# Patient Record
Sex: FEMALE | Race: BLACK OR AFRICAN AMERICAN | NOT HISPANIC OR LATINO
[De-identification: names, ages, dates, MRNs, and addresses within clinical notes are randomized per-mention and may not be internally consistent; named-entity substitution may affect disease eponyms.]

---

## 2017-03-05 ENCOUNTER — RESULT REVIEW (OUTPATIENT)
Age: 77
End: 2017-03-05

## 2018-07-11 ENCOUNTER — APPOINTMENT (OUTPATIENT)
Dept: ORTHOPEDIC SURGERY | Facility: CLINIC | Age: 78
End: 2018-07-11
Payer: MEDICARE

## 2018-07-11 VITALS — WEIGHT: 203 LBS | HEIGHT: 66 IN | BODY MASS INDEX: 32.62 KG/M2

## 2018-07-11 DIAGNOSIS — M17.12 UNILATERAL PRIMARY OSTEOARTHRITIS, LEFT KNEE: ICD-10-CM

## 2018-07-11 DIAGNOSIS — M17.11 UNILATERAL PRIMARY OSTEOARTHRITIS, RIGHT KNEE: ICD-10-CM

## 2018-07-11 PROCEDURE — 99202 OFFICE O/P NEW SF 15 MIN: CPT

## 2018-07-11 PROCEDURE — 73564 X-RAY EXAM KNEE 4 OR MORE: CPT | Mod: LT

## 2018-09-12 ENCOUNTER — OUTPATIENT (OUTPATIENT)
Dept: OUTPATIENT SERVICES | Facility: HOSPITAL | Age: 78
LOS: 1 days | End: 2018-09-12
Payer: COMMERCIAL

## 2018-09-12 VITALS
TEMPERATURE: 98 F | HEART RATE: 75 BPM | SYSTOLIC BLOOD PRESSURE: 136 MMHG | RESPIRATION RATE: 16 BRPM | HEIGHT: 65 IN | OXYGEN SATURATION: 96 % | WEIGHT: 201.94 LBS | DIASTOLIC BLOOD PRESSURE: 90 MMHG

## 2018-09-12 DIAGNOSIS — Z98.89 OTHER SPECIFIED POSTPROCEDURAL STATES: Chronic | ICD-10-CM

## 2018-09-12 DIAGNOSIS — M17.11 UNILATERAL PRIMARY OSTEOARTHRITIS, RIGHT KNEE: ICD-10-CM

## 2018-09-12 DIAGNOSIS — Z01.818 ENCOUNTER FOR OTHER PREPROCEDURAL EXAMINATION: ICD-10-CM

## 2018-09-12 DIAGNOSIS — Z96.642 PRESENCE OF LEFT ARTIFICIAL HIP JOINT: Chronic | ICD-10-CM

## 2018-09-12 DIAGNOSIS — Z96.641 PRESENCE OF RIGHT ARTIFICIAL HIP JOINT: Chronic | ICD-10-CM

## 2018-09-12 LAB
ALBUMIN SERPL ELPH-MCNC: 4.3 G/DL — SIGNIFICANT CHANGE UP (ref 3.3–5)
ALP SERPL-CCNC: 77 U/L — SIGNIFICANT CHANGE UP (ref 30–120)
ALT FLD-CCNC: 30 U/L DA — SIGNIFICANT CHANGE UP (ref 10–60)
ANION GAP SERPL CALC-SCNC: 6 MMOL/L — SIGNIFICANT CHANGE UP (ref 5–17)
APTT BLD: 34.3 SEC — SIGNIFICANT CHANGE UP (ref 27.5–37.4)
AST SERPL-CCNC: 25 U/L — SIGNIFICANT CHANGE UP (ref 10–40)
BILIRUB SERPL-MCNC: 0.5 MG/DL — SIGNIFICANT CHANGE UP (ref 0.2–1.2)
BLD GP AB SCN SERPL QL: SIGNIFICANT CHANGE UP
BUN SERPL-MCNC: 13 MG/DL — SIGNIFICANT CHANGE UP (ref 7–23)
CALCIUM SERPL-MCNC: 11.4 MG/DL — HIGH (ref 8.4–10.5)
CHLORIDE SERPL-SCNC: 106 MMOL/L — SIGNIFICANT CHANGE UP (ref 96–108)
CO2 SERPL-SCNC: 30 MMOL/L — SIGNIFICANT CHANGE UP (ref 22–31)
CREAT SERPL-MCNC: 0.94 MG/DL — SIGNIFICANT CHANGE UP (ref 0.5–1.3)
GLUCOSE SERPL-MCNC: 100 MG/DL — HIGH (ref 70–99)
HCT VFR BLD CALC: 47.3 % — HIGH (ref 34.5–45)
HGB BLD-MCNC: 15 G/DL — SIGNIFICANT CHANGE UP (ref 11.5–15.5)
INR BLD: 1.01 RATIO — SIGNIFICANT CHANGE UP (ref 0.88–1.16)
MCHC RBC-ENTMCNC: 29.1 PG — SIGNIFICANT CHANGE UP (ref 27–34)
MCHC RBC-ENTMCNC: 31.7 GM/DL — LOW (ref 32–36)
MCV RBC AUTO: 91.8 FL — SIGNIFICANT CHANGE UP (ref 80–100)
NRBC # BLD: 0 /100 WBCS — SIGNIFICANT CHANGE UP (ref 0–0)
PLATELET # BLD AUTO: 192 K/UL — SIGNIFICANT CHANGE UP (ref 150–400)
POTASSIUM SERPL-MCNC: 4 MMOL/L — SIGNIFICANT CHANGE UP (ref 3.5–5.3)
POTASSIUM SERPL-SCNC: 4 MMOL/L — SIGNIFICANT CHANGE UP (ref 3.5–5.3)
PROT SERPL-MCNC: 8.2 G/DL — SIGNIFICANT CHANGE UP (ref 6–8.3)
PROTHROM AB SERPL-ACNC: 11 SEC — SIGNIFICANT CHANGE UP (ref 9.8–12.7)
RBC # BLD: 5.15 M/UL — SIGNIFICANT CHANGE UP (ref 3.8–5.2)
RBC # FLD: 12.8 % — SIGNIFICANT CHANGE UP (ref 10.3–14.5)
SODIUM SERPL-SCNC: 142 MMOL/L — SIGNIFICANT CHANGE UP (ref 135–145)
WBC # BLD: 5.78 K/UL — SIGNIFICANT CHANGE UP (ref 3.8–10.5)
WBC # FLD AUTO: 5.78 K/UL — SIGNIFICANT CHANGE UP (ref 3.8–10.5)

## 2018-09-12 PROCEDURE — 86850 RBC ANTIBODY SCREEN: CPT

## 2018-09-12 PROCEDURE — 80053 COMPREHEN METABOLIC PANEL: CPT

## 2018-09-12 PROCEDURE — 87640 STAPH A DNA AMP PROBE: CPT

## 2018-09-12 PROCEDURE — 86900 BLOOD TYPING SEROLOGIC ABO: CPT

## 2018-09-12 PROCEDURE — 86901 BLOOD TYPING SEROLOGIC RH(D): CPT

## 2018-09-12 PROCEDURE — 85027 COMPLETE CBC AUTOMATED: CPT

## 2018-09-12 PROCEDURE — 85730 THROMBOPLASTIN TIME PARTIAL: CPT

## 2018-09-12 PROCEDURE — 93010 ELECTROCARDIOGRAM REPORT: CPT

## 2018-09-12 PROCEDURE — G0463: CPT

## 2018-09-12 PROCEDURE — 85610 PROTHROMBIN TIME: CPT

## 2018-09-12 PROCEDURE — 36415 COLL VENOUS BLD VENIPUNCTURE: CPT

## 2018-09-12 PROCEDURE — 93005 ELECTROCARDIOGRAM TRACING: CPT

## 2018-09-12 NOTE — H&P PST ADULT - FAMILY HISTORY
Sibling  Still living? No  Family history of stroke, Age at diagnosis: Age Unknown  Family history of lung cancer, Age at diagnosis: Age Unknown

## 2018-09-12 NOTE — H&P PST ADULT - MUSCULOSKELETAL
details… detailed exam no calf tenderness/no joint erythema/no joint swelling/normal strength/decreased ROM due to pain/no joint warmth

## 2018-09-12 NOTE — H&P PST ADULT - HISTORY OF PRESENT ILLNESS
78 yo female presents to PST for scheduled RIGHT total knee replacement on 10/2/18 with Rosales Hopkins MD.  Patient with longstanding RIGHT knee pain with swelling for which she has tried PT without relief.  Pain worst with stair climbing, locking and rates 10/10.

## 2018-09-12 NOTE — H&P PST ADULT - NEGATIVE ENMT SYMPTOMS
no nose bleeds/no sinus symptoms/no ear pain/no nasal discharge/no gum bleeding/no dry mouth/no throat pain/no dysphagia/no tinnitus/no hearing difficulty/no nasal congestion/no nasal obstruction/no recurrent cold sores/no vertigo/no post-nasal discharge/no abnormal taste sensation

## 2018-09-12 NOTE — H&P PST ADULT - PROBLEM SELECTOR PLAN 1
"Right total knee replacement" on 10/2/18  Diagnostics ordered  Pending medical and cardiac clearance  Questions answered  Instructions reviewed and signed  Contact info given   Best wishes offered

## 2018-09-13 LAB
MRSA PCR RESULT.: SIGNIFICANT CHANGE UP
S AUREUS DNA NOSE QL NAA+PROBE: SIGNIFICANT CHANGE UP

## 2018-09-20 PROBLEM — E66.9 OBESITY, UNSPECIFIED: Chronic | Status: ACTIVE | Noted: 2018-09-12

## 2018-09-20 PROBLEM — M17.11 UNILATERAL PRIMARY OSTEOARTHRITIS, RIGHT KNEE: Chronic | Status: ACTIVE | Noted: 2018-09-12

## 2018-09-21 ENCOUNTER — OUTPATIENT (OUTPATIENT)
Dept: OUTPATIENT SERVICES | Facility: HOSPITAL | Age: 78
LOS: 1 days | End: 2018-09-21
Payer: COMMERCIAL

## 2018-09-21 DIAGNOSIS — Z01.818 ENCOUNTER FOR OTHER PREPROCEDURAL EXAMINATION: ICD-10-CM

## 2018-09-21 DIAGNOSIS — R94.31 ABNORMAL ELECTROCARDIOGRAM [ECG] [EKG]: ICD-10-CM

## 2018-09-21 DIAGNOSIS — Z96.641 PRESENCE OF RIGHT ARTIFICIAL HIP JOINT: Chronic | ICD-10-CM

## 2018-09-21 DIAGNOSIS — Z98.89 OTHER SPECIFIED POSTPROCEDURAL STATES: Chronic | ICD-10-CM

## 2018-09-21 DIAGNOSIS — Z96.642 PRESENCE OF LEFT ARTIFICIAL HIP JOINT: Chronic | ICD-10-CM

## 2018-09-21 PROCEDURE — 93018 CV STRESS TEST I&R ONLY: CPT

## 2018-09-21 PROCEDURE — 93017 CV STRESS TEST TRACING ONLY: CPT

## 2018-09-21 PROCEDURE — 93016 CV STRESS TEST SUPVJ ONLY: CPT

## 2018-09-21 PROCEDURE — 78452 HT MUSCLE IMAGE SPECT MULT: CPT

## 2018-09-21 PROCEDURE — A9502: CPT

## 2018-09-21 PROCEDURE — 78452 HT MUSCLE IMAGE SPECT MULT: CPT | Mod: 26

## 2018-10-01 ENCOUNTER — TRANSCRIPTION ENCOUNTER (OUTPATIENT)
Age: 78
End: 2018-10-01

## 2018-10-01 RX ORDER — SODIUM CHLORIDE 9 MG/ML
1000 INJECTION, SOLUTION INTRAVENOUS
Qty: 0 | Refills: 0 | Status: DISCONTINUED | OUTPATIENT
Start: 2018-10-02 | End: 2018-10-03

## 2018-10-01 RX ORDER — ONDANSETRON 8 MG/1
4 TABLET, FILM COATED ORAL EVERY 6 HOURS
Qty: 0 | Refills: 0 | Status: DISCONTINUED | OUTPATIENT
Start: 2018-10-02 | End: 2018-10-04

## 2018-10-01 RX ORDER — DOCUSATE SODIUM 100 MG
100 CAPSULE ORAL THREE TIMES A DAY
Qty: 0 | Refills: 0 | Status: DISCONTINUED | OUTPATIENT
Start: 2018-10-02 | End: 2018-10-04

## 2018-10-01 RX ORDER — PANTOPRAZOLE SODIUM 20 MG/1
40 TABLET, DELAYED RELEASE ORAL DAILY
Qty: 0 | Refills: 0 | Status: DISCONTINUED | OUTPATIENT
Start: 2018-10-03 | End: 2018-10-04

## 2018-10-01 RX ORDER — MAGNESIUM HYDROXIDE 400 MG/1
30 TABLET, CHEWABLE ORAL DAILY
Qty: 0 | Refills: 0 | Status: DISCONTINUED | OUTPATIENT
Start: 2018-10-02 | End: 2018-10-04

## 2018-10-01 RX ORDER — SENNA PLUS 8.6 MG/1
2 TABLET ORAL AT BEDTIME
Qty: 0 | Refills: 0 | Status: DISCONTINUED | OUTPATIENT
Start: 2018-10-02 | End: 2018-10-04

## 2018-10-01 RX ORDER — POLYETHYLENE GLYCOL 3350 17 G/17G
17 POWDER, FOR SOLUTION ORAL DAILY
Qty: 0 | Refills: 0 | Status: DISCONTINUED | OUTPATIENT
Start: 2018-10-02 | End: 2018-10-04

## 2018-10-01 NOTE — PATIENT PROFILE ADULT. - PMH
BMI 33.0-33.9,adult    Diverticulitis    Essential hypertension    Hyperlipidemia    Obesity (BMI 30.0-34.9)    Osteoarthritis of right knee

## 2018-10-01 NOTE — PATIENT PROFILE ADULT. - PSH
History of hip replacement, total, right  2016  S/P breast biopsy, left  2017  S/P hip replacement, left  2015 History of hip replacement, total, right  2016 bilateral  S/P breast biopsy, left  2017  S/P hip replacement, left  2015 bilateral

## 2018-10-02 ENCOUNTER — APPOINTMENT (OUTPATIENT)
Dept: ORTHOPEDIC SURGERY | Facility: HOSPITAL | Age: 78
End: 2018-10-02

## 2018-10-02 ENCOUNTER — INPATIENT (INPATIENT)
Facility: HOSPITAL | Age: 78
LOS: 1 days | Discharge: HOME | DRG: 470 | End: 2018-10-04
Attending: ORTHOPAEDIC SURGERY | Admitting: ORTHOPAEDIC SURGERY
Payer: COMMERCIAL

## 2018-10-02 ENCOUNTER — RESULT REVIEW (OUTPATIENT)
Age: 78
End: 2018-10-02

## 2018-10-02 VITALS
DIASTOLIC BLOOD PRESSURE: 102 MMHG | RESPIRATION RATE: 21 BRPM | SYSTOLIC BLOOD PRESSURE: 141 MMHG | HEIGHT: 65 IN | HEART RATE: 75 BPM | TEMPERATURE: 98 F | WEIGHT: 205.25 LBS | OXYGEN SATURATION: 98 %

## 2018-10-02 DIAGNOSIS — Z96.642 PRESENCE OF LEFT ARTIFICIAL HIP JOINT: Chronic | ICD-10-CM

## 2018-10-02 DIAGNOSIS — M17.11 UNILATERAL PRIMARY OSTEOARTHRITIS, RIGHT KNEE: ICD-10-CM

## 2018-10-02 DIAGNOSIS — Z96.641 PRESENCE OF RIGHT ARTIFICIAL HIP JOINT: Chronic | ICD-10-CM

## 2018-10-02 DIAGNOSIS — Z01.818 ENCOUNTER FOR OTHER PREPROCEDURAL EXAMINATION: ICD-10-CM

## 2018-10-02 DIAGNOSIS — Z98.89 OTHER SPECIFIED POSTPROCEDURAL STATES: Chronic | ICD-10-CM

## 2018-10-02 LAB
ANION GAP SERPL CALC-SCNC: 7 MMOL/L — SIGNIFICANT CHANGE UP (ref 5–17)
BUN SERPL-MCNC: 12 MG/DL — SIGNIFICANT CHANGE UP (ref 7–23)
CALCIUM SERPL-MCNC: 10 MG/DL — SIGNIFICANT CHANGE UP (ref 8.4–10.5)
CHLORIDE SERPL-SCNC: 106 MMOL/L — SIGNIFICANT CHANGE UP (ref 96–108)
CO2 SERPL-SCNC: 27 MMOL/L — SIGNIFICANT CHANGE UP (ref 22–31)
CREAT SERPL-MCNC: 0.92 MG/DL — SIGNIFICANT CHANGE UP (ref 0.5–1.3)
GLUCOSE SERPL-MCNC: 157 MG/DL — HIGH (ref 70–99)
HCT VFR BLD CALC: 43.3 % — SIGNIFICANT CHANGE UP (ref 34.5–45)
HGB BLD-MCNC: 13.7 G/DL — SIGNIFICANT CHANGE UP (ref 11.5–15.5)
POTASSIUM SERPL-MCNC: 4.2 MMOL/L — SIGNIFICANT CHANGE UP (ref 3.5–5.3)
POTASSIUM SERPL-SCNC: 4.2 MMOL/L — SIGNIFICANT CHANGE UP (ref 3.5–5.3)
SODIUM SERPL-SCNC: 140 MMOL/L — SIGNIFICANT CHANGE UP (ref 135–145)

## 2018-10-02 PROCEDURE — 88311 DECALCIFY TISSUE: CPT | Mod: 26

## 2018-10-02 PROCEDURE — 27447 TOTAL KNEE ARTHROPLASTY: CPT | Mod: RT

## 2018-10-02 PROCEDURE — 73562 X-RAY EXAM OF KNEE 3: CPT | Mod: 26,RT

## 2018-10-02 PROCEDURE — 88305 TISSUE EXAM BY PATHOLOGIST: CPT | Mod: 26

## 2018-10-02 PROCEDURE — 99222 1ST HOSP IP/OBS MODERATE 55: CPT

## 2018-10-02 PROCEDURE — 27447 TOTAL KNEE ARTHROPLASTY: CPT | Mod: AS,RT

## 2018-10-02 PROCEDURE — 73560 X-RAY EXAM OF KNEE 1 OR 2: CPT | Mod: 26,RT

## 2018-10-02 RX ORDER — ACETAMINOPHEN 500 MG
1000 TABLET ORAL EVERY 8 HOURS
Qty: 0 | Refills: 0 | Status: DISCONTINUED | OUTPATIENT
Start: 2018-10-03 | End: 2018-10-04

## 2018-10-02 RX ORDER — ATORVASTATIN CALCIUM 80 MG/1
10 TABLET, FILM COATED ORAL AT BEDTIME
Qty: 0 | Refills: 0 | Status: DISCONTINUED | OUTPATIENT
Start: 2018-10-02 | End: 2018-10-04

## 2018-10-02 RX ORDER — OXYCODONE HYDROCHLORIDE 5 MG/1
10 TABLET ORAL
Qty: 0 | Refills: 0 | Status: DISCONTINUED | OUTPATIENT
Start: 2018-10-02 | End: 2018-10-04

## 2018-10-02 RX ORDER — METOPROLOL TARTRATE 50 MG
25 TABLET ORAL DAILY
Qty: 0 | Refills: 0 | Status: DISCONTINUED | OUTPATIENT
Start: 2018-10-02 | End: 2018-10-04

## 2018-10-02 RX ORDER — HYDROMORPHONE HYDROCHLORIDE 2 MG/ML
0.5 INJECTION INTRAMUSCULAR; INTRAVENOUS; SUBCUTANEOUS
Qty: 0 | Refills: 0 | Status: DISCONTINUED | OUTPATIENT
Start: 2018-10-02 | End: 2018-10-02

## 2018-10-02 RX ORDER — ACETAMINOPHEN 500 MG
1000 TABLET ORAL ONCE
Qty: 0 | Refills: 0 | Status: COMPLETED | OUTPATIENT
Start: 2018-10-02 | End: 2018-10-02

## 2018-10-02 RX ORDER — SODIUM CHLORIDE 9 MG/ML
1000 INJECTION, SOLUTION INTRAVENOUS
Qty: 0 | Refills: 0 | Status: DISCONTINUED | OUTPATIENT
Start: 2018-10-02 | End: 2018-10-02

## 2018-10-02 RX ORDER — ONDANSETRON 8 MG/1
4 TABLET, FILM COATED ORAL ONCE
Qty: 0 | Refills: 0 | Status: DISCONTINUED | OUTPATIENT
Start: 2018-10-02 | End: 2018-10-02

## 2018-10-02 RX ORDER — ACETAMINOPHEN 500 MG
1000 TABLET ORAL EVERY 6 HOURS
Qty: 0 | Refills: 0 | Status: COMPLETED | OUTPATIENT
Start: 2018-10-02 | End: 2018-10-03

## 2018-10-02 RX ORDER — ASPIRIN/CALCIUM CARB/MAGNESIUM 324 MG
81 TABLET ORAL
Qty: 0 | Refills: 0 | Status: DISCONTINUED | OUTPATIENT
Start: 2018-10-03 | End: 2018-10-04

## 2018-10-02 RX ORDER — TRANEXAMIC ACID 100 MG/ML
1000 INJECTION, SOLUTION INTRAVENOUS ONCE
Qty: 0 | Refills: 0 | Status: COMPLETED | OUTPATIENT
Start: 2018-10-02 | End: 2018-10-02

## 2018-10-02 RX ORDER — CEFAZOLIN SODIUM 1 G
2000 VIAL (EA) INJECTION EVERY 8 HOURS
Qty: 0 | Refills: 0 | Status: COMPLETED | OUTPATIENT
Start: 2018-10-02 | End: 2018-10-03

## 2018-10-02 RX ORDER — HYDROMORPHONE HYDROCHLORIDE 2 MG/ML
0.5 INJECTION INTRAMUSCULAR; INTRAVENOUS; SUBCUTANEOUS
Qty: 0 | Refills: 0 | Status: DISCONTINUED | OUTPATIENT
Start: 2018-10-02 | End: 2018-10-04

## 2018-10-02 RX ORDER — AMLODIPINE BESYLATE 2.5 MG/1
10 TABLET ORAL DAILY
Qty: 0 | Refills: 0 | Status: DISCONTINUED | OUTPATIENT
Start: 2018-10-02 | End: 2018-10-04

## 2018-10-02 RX ORDER — CEFAZOLIN SODIUM 1 G
2000 VIAL (EA) INJECTION ONCE
Qty: 0 | Refills: 0 | Status: COMPLETED | OUTPATIENT
Start: 2018-10-02 | End: 2018-10-02

## 2018-10-02 RX ORDER — APREPITANT 80 MG/1
40 CAPSULE ORAL ONCE
Qty: 0 | Refills: 0 | Status: COMPLETED | OUTPATIENT
Start: 2018-10-02 | End: 2018-10-02

## 2018-10-02 RX ORDER — OXYCODONE HYDROCHLORIDE 5 MG/1
5 TABLET ORAL
Qty: 0 | Refills: 0 | Status: DISCONTINUED | OUTPATIENT
Start: 2018-10-02 | End: 2018-10-04

## 2018-10-02 RX ORDER — CHLORHEXIDINE GLUCONATE 213 G/1000ML
1 SOLUTION TOPICAL ONCE
Qty: 0 | Refills: 0 | Status: COMPLETED | OUTPATIENT
Start: 2018-10-02 | End: 2018-10-02

## 2018-10-02 RX ADMIN — Medication 1000 MILLIGRAM(S): at 18:08

## 2018-10-02 RX ADMIN — CHLORHEXIDINE GLUCONATE 1 APPLICATION(S): 213 SOLUTION TOPICAL at 10:47

## 2018-10-02 RX ADMIN — Medication 100 MILLIGRAM(S): at 20:05

## 2018-10-02 RX ADMIN — Medication 400 MILLIGRAM(S): at 23:31

## 2018-10-02 RX ADMIN — HYDROMORPHONE HYDROCHLORIDE 0.5 MILLIGRAM(S): 2 INJECTION INTRAMUSCULAR; INTRAVENOUS; SUBCUTANEOUS at 16:22

## 2018-10-02 RX ADMIN — SODIUM CHLORIDE 125 MILLILITER(S): 9 INJECTION, SOLUTION INTRAVENOUS at 20:05

## 2018-10-02 RX ADMIN — HYDROMORPHONE HYDROCHLORIDE 0.5 MILLIGRAM(S): 2 INJECTION INTRAMUSCULAR; INTRAVENOUS; SUBCUTANEOUS at 16:08

## 2018-10-02 RX ADMIN — APREPITANT 40 MILLIGRAM(S): 80 CAPSULE ORAL at 10:34

## 2018-10-02 RX ADMIN — OXYCODONE HYDROCHLORIDE 5 MILLIGRAM(S): 5 TABLET ORAL at 22:05

## 2018-10-02 RX ADMIN — Medication 400 MILLIGRAM(S): at 18:07

## 2018-10-02 RX ADMIN — ATORVASTATIN CALCIUM 10 MILLIGRAM(S): 80 TABLET, FILM COATED ORAL at 21:31

## 2018-10-02 RX ADMIN — SODIUM CHLORIDE 75 MILLILITER(S): 9 INJECTION, SOLUTION INTRAVENOUS at 15:40

## 2018-10-02 RX ADMIN — OXYCODONE HYDROCHLORIDE 5 MILLIGRAM(S): 5 TABLET ORAL at 21:31

## 2018-10-02 RX ADMIN — Medication 1000 MILLIGRAM(S): at 23:53

## 2018-10-02 NOTE — BRIEF OPERATIVE NOTE - PROCEDURE
<<-----Click on this checkbox to enter Procedure Knee arthroplasty  10/02/2018  right  Active  AROMANSK

## 2018-10-02 NOTE — CONSULT NOTE ADULT - SUBJECTIVE AND OBJECTIVE BOX
CC.  S/p Right Total knee replacement  HPI Patient is 78 yo female presenting s/p right Total knee replacement.  Pain well controlled.  Offers no other complaints    Pain prior to surgery has been longstanding with swelling for which she has tried PT without relief, severe to moderate, non-radiating, relieved by rest, and exacerbated with movement.  Pain worst with stair climbing, locking and rates 10/10.    Constitutional: No fever, fatigue or weight loss.  Skin: No rash.  Eyes: No recent vision problems or eye pain.  ENT: No congestion, ear pain, or sore throat.  Endocrine: No thyroid problems.  Cardiovascular: No chest pain or palpation.  Respiratory: No cough, shortness of breath, congestion, or wheezing.  Gastrointestinal: No abdominal pain, nausea, vomiting, or diarrhea.  Genitourinary: No dysuria.  Musculoskeletal: No joint swelling.  Neurologic: No headache.      Allergies/Medications:   Allergies:        Allergies:  	sulfa drugs: Drug Category, Other, unknown  	raw vegetables and raw fruit: Food, Nausea, raw vegetables and raw fruit    Home Medications:   * Patient Currently Takes Medications as of 12-Sep-2018 12:10 documented in Structured Notes  · 	atorvastatin 10 mg oral tablet: Last Dose Taken:  , 1 tab(s) orally once a day (at bedtime)  · 	amLODIPine 10 mg oral tablet: Last Dose Taken:  , 1 tab(s) orally once a day    .    PMH/PSH/FH/SH:    Past Medical History:  BMI 33.0-33.9,adult    Diverticulitis    Essential hypertension    Hyperlipidemia    Obesity (BMI 30.0-34.9)    Osteoarthritis of right knee.     Past Surgical History:  History of hip replacement, total, right  2016  S/P breast biopsy, left  2017  S/P hip replacement, left  .     Family History:  Sibling  Still living? No  Family history of stroke, Age at diagnosis: Age Unknown  Family history of lung cancer, Age at diagnosis: Age Unknown.     Social History:  · Marital Status	  · Occupation	retired  · Lives With	children  · Notes	5 children; 2 siblings   denies any ETOH/Tob/Illicit drug usage    Vital Signs Last 24 Hrs  T(C): 36.6 (02 Oct 2018 15:10), Max: 36.7 (02 Oct 2018 10:08)  T(F): 97.8 (02 Oct 2018 15:10), Max: 98 (02 Oct 2018 10:08)  HR: 70 (02 Oct 2018 15:55) (62 - 75)  BP: 123/76 (02 Oct 2018 15:55) (109/54 - 141/102)  BP(mean): --  RR: 20 (02 Oct 2018 15:55) (20 - 24)  SpO2: 98% (02 Oct 2018 15:55) (96% - 100%)      PHYSICAL EXAM-  GENERAL: NAD, well-groomed, well-developed  HEAD:  Atraumatic, Normocephalic  EYES: EOMI, PERRLA, conjunctiva and sclera clear  NECK: Supple, No JVD, Normal thyroid  NERVOUS SYSTEM:  Alert & Oriented X3, Motor Strength 5/5 B/L upper and lower extremities; DTRs 2+ intact and symmetric  CHEST/LUNG: Clear to percussion bilaterally; No rales, rhonchi, wheezing, or rubs  HEART: Regular rate and rhythm; No murmurs, rubs, or gallops  ABDOMEN: Soft, Nontender, Nondistended; Bowel sounds present  EXTREMITIES:  2+ Peripheral Pulses, No clubbing, cyanosis, or edema  SKIN: No rashes or lesions

## 2018-10-02 NOTE — PHYSICAL THERAPY INITIAL EVALUATION ADULT - ADDITIONAL COMMENTS
Pt lives with 2 sons in a house with 5 steps to enter with rail, 14 steps inside to bedroom with rail.  Pt has straight cane

## 2018-10-02 NOTE — PHYSICAL THERAPY INITIAL EVALUATION ADULT - GAIT DEVIATIONS NOTED, PT EVAL
decreased weight-shifting ability/decreased velocity of limb motion/decreased step length/decreased stride length/decreased sushant

## 2018-10-02 NOTE — CONSULT NOTE ADULT - ASSESSMENT
Patient is 78 yo female with hx of Diverticulitis, Essential hypertension, Hyperlipidemia, Obesity, and Osteoarthritis of right knee presenting with     1.  S/P right Total knee replacement.  Continue with pain management, DVT proph, and wound care as per Ortho.  PT/OT  2. HTN.  Continue with Norvasc and Monitor BP  3. HLD.  Continue with statin  Plan of care was discussed with patient  in great details, All questions were answered to their satisfaction  Seems to understand, and in agreement

## 2018-10-02 NOTE — PHYSICAL THERAPY INITIAL EVALUATION ADULT - GAIT TRAINING, PT EVAL
Goals 2-4 days, Pt will ambulate 150 ft w/ rolling walker independently.    Pt will negotiate 10 steps with rail and straight cane independently

## 2018-10-03 ENCOUNTER — TRANSCRIPTION ENCOUNTER (OUTPATIENT)
Age: 78
End: 2018-10-03

## 2018-10-03 LAB
ANION GAP SERPL CALC-SCNC: 6 MMOL/L — SIGNIFICANT CHANGE UP (ref 5–17)
BUN SERPL-MCNC: 10 MG/DL — SIGNIFICANT CHANGE UP (ref 7–23)
CALCIUM SERPL-MCNC: 10 MG/DL — SIGNIFICANT CHANGE UP (ref 8.4–10.5)
CHLORIDE SERPL-SCNC: 104 MMOL/L — SIGNIFICANT CHANGE UP (ref 96–108)
CO2 SERPL-SCNC: 26 MMOL/L — SIGNIFICANT CHANGE UP (ref 22–31)
CREAT SERPL-MCNC: 0.78 MG/DL — SIGNIFICANT CHANGE UP (ref 0.5–1.3)
GLUCOSE SERPL-MCNC: 147 MG/DL — HIGH (ref 70–99)
HCT VFR BLD CALC: 37.3 % — SIGNIFICANT CHANGE UP (ref 34.5–45)
HGB BLD-MCNC: 12.1 G/DL — SIGNIFICANT CHANGE UP (ref 11.5–15.5)
MCHC RBC-ENTMCNC: 29.7 PG — SIGNIFICANT CHANGE UP (ref 27–34)
MCHC RBC-ENTMCNC: 32.4 GM/DL — SIGNIFICANT CHANGE UP (ref 32–36)
MCV RBC AUTO: 91.4 FL — SIGNIFICANT CHANGE UP (ref 80–100)
NRBC # BLD: 0 /100 WBCS — SIGNIFICANT CHANGE UP (ref 0–0)
PLATELET # BLD AUTO: 180 K/UL — SIGNIFICANT CHANGE UP (ref 150–400)
POTASSIUM SERPL-MCNC: 4.3 MMOL/L — SIGNIFICANT CHANGE UP (ref 3.5–5.3)
POTASSIUM SERPL-SCNC: 4.3 MMOL/L — SIGNIFICANT CHANGE UP (ref 3.5–5.3)
RBC # BLD: 4.08 M/UL — SIGNIFICANT CHANGE UP (ref 3.8–5.2)
RBC # FLD: 12.7 % — SIGNIFICANT CHANGE UP (ref 10.3–14.5)
SODIUM SERPL-SCNC: 136 MMOL/L — SIGNIFICANT CHANGE UP (ref 135–145)
WBC # BLD: 13.9 K/UL — HIGH (ref 3.8–10.5)
WBC # FLD AUTO: 13.9 K/UL — HIGH (ref 3.8–10.5)

## 2018-10-03 PROCEDURE — 99232 SBSQ HOSP IP/OBS MODERATE 35: CPT

## 2018-10-03 RX ORDER — SENNA PLUS 8.6 MG/1
2 TABLET ORAL
Qty: 0 | Refills: 0 | DISCHARGE
Start: 2018-10-03

## 2018-10-03 RX ORDER — ASPIRIN/CALCIUM CARB/MAGNESIUM 324 MG
1 TABLET ORAL
Qty: 82 | Refills: 0
Start: 2018-10-03 | End: 2018-11-12

## 2018-10-03 RX ORDER — POLYETHYLENE GLYCOL 3350 17 G/17G
17 POWDER, FOR SOLUTION ORAL
Qty: 0 | Refills: 0 | DISCHARGE
Start: 2018-10-03

## 2018-10-03 RX ORDER — PANTOPRAZOLE SODIUM 20 MG/1
1 TABLET, DELAYED RELEASE ORAL
Qty: 30 | Refills: 1
Start: 2018-10-03 | End: 2018-12-01

## 2018-10-03 RX ORDER — DOCUSATE SODIUM 100 MG
1 CAPSULE ORAL
Qty: 0 | Refills: 0 | DISCHARGE
Start: 2018-10-03

## 2018-10-03 RX ORDER — ACETAMINOPHEN 500 MG
2 TABLET ORAL
Qty: 0 | Refills: 0 | DISCHARGE
Start: 2018-10-03 | End: 2018-10-16

## 2018-10-03 RX ORDER — FAMOTIDINE 10 MG/ML
1 INJECTION INTRAVENOUS
Qty: 0 | Refills: 0 | COMMUNITY

## 2018-10-03 RX ADMIN — OXYCODONE HYDROCHLORIDE 10 MILLIGRAM(S): 5 TABLET ORAL at 13:57

## 2018-10-03 RX ADMIN — OXYCODONE HYDROCHLORIDE 10 MILLIGRAM(S): 5 TABLET ORAL at 22:20

## 2018-10-03 RX ADMIN — HYDROMORPHONE HYDROCHLORIDE 0.5 MILLIGRAM(S): 2 INJECTION INTRAMUSCULAR; INTRAVENOUS; SUBCUTANEOUS at 16:07

## 2018-10-03 RX ADMIN — OXYCODONE HYDROCHLORIDE 10 MILLIGRAM(S): 5 TABLET ORAL at 13:27

## 2018-10-03 RX ADMIN — Medication 1000 MILLIGRAM(S): at 21:50

## 2018-10-03 RX ADMIN — SODIUM CHLORIDE 125 MILLILITER(S): 9 INJECTION, SOLUTION INTRAVENOUS at 03:58

## 2018-10-03 RX ADMIN — Medication 100 MILLIGRAM(S): at 03:57

## 2018-10-03 RX ADMIN — Medication 1000 MILLIGRAM(S): at 13:26

## 2018-10-03 RX ADMIN — Medication 100 MILLIGRAM(S): at 13:26

## 2018-10-03 RX ADMIN — OXYCODONE HYDROCHLORIDE 10 MILLIGRAM(S): 5 TABLET ORAL at 21:50

## 2018-10-03 RX ADMIN — Medication 1000 MILLIGRAM(S): at 22:58

## 2018-10-03 RX ADMIN — OXYCODONE HYDROCHLORIDE 10 MILLIGRAM(S): 5 TABLET ORAL at 00:18

## 2018-10-03 RX ADMIN — OXYCODONE HYDROCHLORIDE 5 MILLIGRAM(S): 5 TABLET ORAL at 18:59

## 2018-10-03 RX ADMIN — Medication 81 MILLIGRAM(S): at 17:45

## 2018-10-03 RX ADMIN — Medication 100 MILLIGRAM(S): at 21:50

## 2018-10-03 RX ADMIN — ATORVASTATIN CALCIUM 10 MILLIGRAM(S): 80 TABLET, FILM COATED ORAL at 21:50

## 2018-10-03 RX ADMIN — AMLODIPINE BESYLATE 10 MILLIGRAM(S): 2.5 TABLET ORAL at 05:41

## 2018-10-03 RX ADMIN — HYDROMORPHONE HYDROCHLORIDE 0.5 MILLIGRAM(S): 2 INJECTION INTRAMUSCULAR; INTRAVENOUS; SUBCUTANEOUS at 15:37

## 2018-10-03 RX ADMIN — Medication 1000 MILLIGRAM(S): at 06:35

## 2018-10-03 RX ADMIN — PANTOPRAZOLE SODIUM 40 MILLIGRAM(S): 20 TABLET, DELAYED RELEASE ORAL at 05:41

## 2018-10-03 RX ADMIN — OXYCODONE HYDROCHLORIDE 10 MILLIGRAM(S): 5 TABLET ORAL at 01:00

## 2018-10-03 RX ADMIN — OXYCODONE HYDROCHLORIDE 10 MILLIGRAM(S): 5 TABLET ORAL at 03:57

## 2018-10-03 RX ADMIN — SENNA PLUS 2 TABLET(S): 8.6 TABLET ORAL at 21:50

## 2018-10-03 RX ADMIN — Medication 400 MILLIGRAM(S): at 05:41

## 2018-10-03 RX ADMIN — Medication 1000 MILLIGRAM(S): at 13:28

## 2018-10-03 RX ADMIN — Medication 100 MILLIGRAM(S): at 05:41

## 2018-10-03 RX ADMIN — Medication 25 MILLIGRAM(S): at 05:41

## 2018-10-03 RX ADMIN — Medication 81 MILLIGRAM(S): at 05:41

## 2018-10-03 RX ADMIN — OXYCODONE HYDROCHLORIDE 5 MILLIGRAM(S): 5 TABLET ORAL at 17:45

## 2018-10-03 RX ADMIN — OXYCODONE HYDROCHLORIDE 10 MILLIGRAM(S): 5 TABLET ORAL at 04:29

## 2018-10-03 NOTE — DISCHARGE NOTE ADULT - CARE PLAN
Principal Discharge DX:	Primary osteoarthritis of right knee  Goal:	Improve ambulation, ADLs and quality of life  Assessment and plan of treatment:	Physical Therapy/Occupational Therapy for: ambulation, transfers, stairs, ADL's (activities of daily living), range of motion exercises, and isometrics  -Activity• Weight Bearing as tolerated with rolling walker.  • Take short, frequent walks increasing the distance that you walk each day as tolerated.  • Change your position every hour to decrease pain and stiffness.  • Continue the exercises taught to you by your physical therapist.  • No driving until cleared by the doctor.  • No tub baths, hot tubs, or swimming pools until instructed by your doctor.  • Do not squat down on the floor.• Do not kneel or twist your knee.  • Range of Motion Goals: Flexion= 120 degrees, Extension = 0 degrees  Keep incision clean and dry. May shower 5 days after surgery if no drainage from incision.  Suture removal 2 weeks after surgery at Surgeon's office.  - Call your doctor if you experience:  • An increase in pain not controlled by pain medication or change in activity or position.  • Temperature greater than 101° F.  • Redness, increased swelling or foul smelling drainage from or around the incision.  • Numbness, tingling or a change in color or temperature of the operative leg.  • Call your doctor immediately if you experience chest pain, shortness of breath or calf pain.  Assessment and plan of treatment:	- Use CPM 2 hours, 2 times a day  - Start 0-60 degrees  - Advance 5-10 degrees each session as tolerated  to goal 110 degrees  - Consider Pain meds prior to CPM  -  Apply ice to knee Post CPM

## 2018-10-03 NOTE — DISCHARGE NOTE ADULT - PATIENT PORTAL LINK FT
You can access the [facility], offered by [facility], by registering with the following website: [email or web address]

## 2018-10-03 NOTE — DISCHARGE NOTE ADULT - HOSPITAL COURSE
This patient was admitted to [facility] with a history of severe degenerative joint disease of the right knee.  Patient went to Pre-Surgical Testing at [facility] and was medically cleared to undergo elective procedure. Patient underwent Right TKR by Dr. Brian Hopkins on 10/3/18. Procedure was well tolerated.  No operative or ayla-operative complications arose during patients hospital course.  Patient received antibiotic according to SCIP guidelines for infection prevention.  Aspirin was given for DVT prophylaxis.  Anesthesia, Medical Hospitalist, Physical Therapy and Occupational Therapy were consulted. Patient is stable for discharge with a good prognosis.  Appropriate discharge instructions and medications are provided in this document.

## 2018-10-03 NOTE — DISCHARGE NOTE ADULT - INSTRUCTIONS
none  For Constipation :   • Increase your water intake. Drink at least 8 glasses of water daily.  • Try adding fiber to your diet by eating fruits, vegetables and foods that are rich in grains.  • If you do experience constipation, you may take an over-the-counter stool softener/laxative such as Serena Colace, Senekot or  Milk of Magnesia.

## 2018-10-03 NOTE — DISCHARGE NOTE ADULT - CARE PROVIDER_API CALL
PHOEBE Hopkins (MD), Orthopaedic Surgery  [address] [place] [address]  Phone: [phone]  Fax: [phone]

## 2018-10-03 NOTE — PROGRESS NOTE ADULT - SUBJECTIVE AND OBJECTIVE BOX
POD  #:  1  S/P  Right TKR                       SUBJECTIVE: Patient seen and examined. Resting comfortably in bed.  Pain is well controlled.  Reported Pain Score = 2    OBJECTIVE:     Vital Signs Last 24 Hrs  T(C): 36.7 (03 Oct 2018 08:22), Max: 36.8 (02 Oct 2018 23:00)  T(F): 98 (03 Oct 2018 08:22), Max: 98.2 (02 Oct 2018 23:00)  HR: 63 (03 Oct 2018 08:22) (56 - 83)  BP: 125/78 (03 Oct 2018 08:22) (109/54 - 139/82)  RR: 18 (03 Oct 2018 08:22) (18 - 24)  SpO2: 95% (03 Oct 2018 08:22) (95% - 100%)    Right Knee:          Dressing: clean/dry/intact, hemovac in place = 30cc overnight    Bilateral LEs:         Sensation:  intact to light touch          Motor exam:  5/5 dorsiflexion/plantarflexion/EHL          2+ DP pulses          calf supple, NT         SCDs in place    LABS:                        12.1   13.90 )-----------( 180      ( 03 Oct 2018 08:17 )             37.3     10-03    136  |  104  |  10  ----------------------------<  147<H>  4.3   |  26  |  0.78    Ca    10.0      03 Oct 2018 08:17            MEDICATIONS:  Anticoagulation:  aspirin enteric coated 81 milliGRAM(s) Oral two times a day      Pain medications:   acetaminophen   Tablet .. 1000 milliGRAM(s) Oral every 8 hours  HYDROmorphone  Injectable 0.5 milliGRAM(s) IV Push every 3 hours PRN  oxyCODONE    IR 5 milliGRAM(s) Oral every 3 hours PRN  oxyCODONE    IR 10 milliGRAM(s) Oral every 3 hours PRN        A/P : Patient stable  s/p Right TKR POD # 1  -    Pain control  -    DVT ppx: aspirin  -    Weight bearing status: WBAT   -    Physical Therapy  -    Occupational Therapy  -    Discharge plan:    home tomorrow if PT and medically cleared

## 2018-10-03 NOTE — DISCHARGE NOTE ADULT - HOME CARE AGENCY
[facility] - [phone]  Nurse to visit the day after hospital discharge; physical therapist to follow. Please contact the home care agency at the above phone number if you have not heard from them by 12 noon on the day after your hospital discharge.

## 2018-10-03 NOTE — DISCHARGE NOTE ADULT - NS AS ACTIVITY OBS
Do not drive or operate machinery/Walking-Indoors allowed/No Heavy lifting/straining/Walking-Outdoors allowed/Stairs allowed

## 2018-10-03 NOTE — DISCHARGE NOTE ADULT - NSFTFSERV1RD_GEN_ALL_CORE
rehabilitation services/teaching and training/medication teaching and assessment/observation and assessment

## 2018-10-03 NOTE — OCCUPATIONAL THERAPY INITIAL EVALUATION ADULT - ORIENTATION, REHAB EVAL
oriented to person, place, time and situation/Patient educated verbally regarding LE weight bearing status, d/c plan, DME needs & role of OT. Pt. provided with education folder including functional exercises, TKR education & caregiver guide pamphlet. Pt educated regarding fall prevention in hospital and recommendation to use call bell/ask for assistance with all ADL's/transfers etc.

## 2018-10-03 NOTE — OCCUPATIONAL THERAPY INITIAL EVALUATION ADULT - ADDITIONAL COMMENTS
Pt lives in a house with 2 sons (1 works and 1 stays at home) with 5 steps with HR to enter. 14 steps with HR to access bedroom and stall shower. Pt owns a commode, rolling walker, cane, sock aide and dressing stick. Pt wears progressive lenses. Pt reports that 1 son is home during the day. Pt lives in a house with 2 sons (1 works and 1 stays at home) with 5 steps with HR to enter. 14 steps with HR to access bedroom and bathtub with sliding doors. Pt owns a commode, rolling walker, cane and dressing stick. Pt wears progressive lenses. Pt reports that 1 son is home during the day.

## 2018-10-03 NOTE — PROGRESS NOTE ADULT - SUBJECTIVE AND OBJECTIVE BOX
CC.  S/p Right Total knee replacement  HPI Patient is 78 yo female presenting s/p right Total knee replacement.  Pain well controlled.  Offers no other complaints    Constitutional: No fever, fatigue or weight loss.  Skin: No rash.  Eyes: No recent vision problems or eye pain.  ENT: No congestion, ear pain, or sore throat.  Endocrine: No thyroid problems.  Cardiovascular: No chest pain or palpation.  Respiratory: No cough, shortness of breath, congestion, or wheezing.  Gastrointestinal: No abdominal pain, nausea, vomiting, or diarrhea.  Genitourinary: No dysuria.  Musculoskeletal: No joint swelling.  Neurologic: No headache.    Vital Signs Last 24 Hrs  T(C): 36.7 (03 Oct 2018 08:22), Max: 36.8 (02 Oct 2018 23:00)  T(F): 98 (03 Oct 2018 08:22), Max: 98.2 (02 Oct 2018 23:00)  HR: 63 (03 Oct 2018 08:22) (56 - 83)  BP: 125/78 (03 Oct 2018 08:22) (109/54 - 139/82)  BP(mean): --  RR: 18 (03 Oct 2018 08:22) (18 - 23)  SpO2: 95% (03 Oct 2018 08:22) (95% - 100%)      PHYSICAL EXAM-  GENERAL: NAD, well-groomed, well-developed  HEAD:  Atraumatic, Normocephalic  EYES: EOMI, PERRLA, conjunctiva and sclera clear  NECK: Supple, No JVD, Normal thyroid  NERVOUS SYSTEM:  Alert & Oriented X3, Motor Strength 5/5 B/L upper and lower extremities; DTRs 2+ intact and symmetric  CHEST/LUNG: Clear to percussion bilaterally; No rales, rhonchi, wheezing, or rubs  HEART: Regular rate and rhythm; No murmurs, rubs, or gallops  ABDOMEN: Soft, Nontender, Nondistended; Bowel sounds present  EXTREMITIES:  2+ Peripheral Pulses, No clubbing, cyanosis, or edema  SKIN: No rashes or lesions                            12.1   13.90 )-----------( 180      ( 03 Oct 2018 08:17 )             37.3     10-03    136  |  104  |  10  ----------------------------<  147<H>  4.3   |  26  |  0.78    Ca    10.0      03 Oct 2018 08:17              MEDICATIONS  (STANDING):  acetaminophen   Tablet .. 1000 milliGRAM(s) Oral every 8 hours  amLODIPine   Tablet 10 milliGRAM(s) Oral daily  aspirin enteric coated 81 milliGRAM(s) Oral two times a day  atorvastatin 10 milliGRAM(s) Oral at bedtime  docusate sodium 100 milliGRAM(s) Oral three times a day  lactated ringers. 1000 milliLiter(s) (125 mL/Hr) IV Continuous <Continuous>  metoprolol tartrate 25 milliGRAM(s) Oral daily  pantoprazole    Tablet 40 milliGRAM(s) Oral daily  senna 2 Tablet(s) Oral at bedtime    MEDICATIONS  (PRN):  aluminum hydroxide/magnesium hydroxide/simethicone Suspension 30 milliLiter(s) Oral four times a day PRN Indigestion  HYDROmorphone  Injectable 0.5 milliGRAM(s) IV Push every 3 hours PRN Severe Pain (7 - 10)  magnesium hydroxide Suspension 30 milliLiter(s) Oral daily PRN Constipation  ondansetron Injectable 4 milliGRAM(s) IV Push every 6 hours PRN Nausea and/or Vomiting  oxyCODONE    IR 5 milliGRAM(s) Oral every 3 hours PRN Mild Pain (1 - 3)  oxyCODONE    IR 10 milliGRAM(s) Oral every 3 hours PRN Moderate Pain (4 - 6)  polyethylene glycol 3350 17 Gram(s) Oral daily PRN Constipation  Imaging Personally Reviewed:     [x ] YES  [ ] NO    Consultant(s) Notes Reviewed:  [x ] YES  [ ] NO    Care Discussed with Consultants/Other Providers [x ] YES  [ ] No medical contraindication for discharge

## 2018-10-03 NOTE — DISCHARGE NOTE ADULT - PLAN OF CARE
Improve ambulation, ADLs and quality of life Physical Therapy/Occupational Therapy for: ambulation, transfers, stairs, ADL's (activities of daily living), range of motion exercises, and isometrics  -Activity• Weight Bearing as tolerated with rolling walker.  • Take short, frequent walks increasing the distance that you walk each day as tolerated.  • Change your position every hour to decrease pain and stiffness.  • Continue the exercises taught to you by your physical therapist.  • No driving until cleared by the doctor.  • No tub baths, hot tubs, or swimming pools until instructed by your doctor.  • Do not squat down on the floor.• Do not kneel or twist your knee.  • Range of Motion Goals: Flexion= 120 degrees, Extension = 0 degrees  Keep incision clean and dry. May shower 5 days after surgery if no drainage from incision.  Suture removal 2 weeks after surgery at Surgeon's office.  - Call your doctor if you experience:  • An increase in pain not controlled by pain medication or change in activity or position.  • Temperature greater than 101° F.  • Redness, increased swelling or foul smelling drainage from or around the incision.  • Numbness, tingling or a change in color or temperature of the operative leg.  • Call your doctor immediately if you experience chest pain, shortness of breath or calf pain. - Use CPM 2 hours, 2 times a day  - Start 0-60 degrees  - Advance 5-10 degrees each session as tolerated  to goal 110 degrees  - Consider Pain meds prior to CPM  -  Apply ice to knee Post CPM

## 2018-10-03 NOTE — DISCHARGE NOTE ADULT - DURABLE MEDICAL EQUIPMENT AGENCY
You own a rolling walker, cane commode.  To get home delivery of CPM machine// to be picked up in about 2 weeks by [facility] [phone]

## 2018-10-04 VITALS
DIASTOLIC BLOOD PRESSURE: 74 MMHG | TEMPERATURE: 98 F | SYSTOLIC BLOOD PRESSURE: 125 MMHG | RESPIRATION RATE: 15 BRPM | OXYGEN SATURATION: 98 % | HEART RATE: 74 BPM

## 2018-10-04 LAB
ANION GAP SERPL CALC-SCNC: 2 MMOL/L — LOW (ref 5–17)
BUN SERPL-MCNC: 12 MG/DL — SIGNIFICANT CHANGE UP (ref 7–23)
CALCIUM SERPL-MCNC: 9.6 MG/DL — SIGNIFICANT CHANGE UP (ref 8.4–10.5)
CHLORIDE SERPL-SCNC: 102 MMOL/L — SIGNIFICANT CHANGE UP (ref 96–108)
CO2 SERPL-SCNC: 30 MMOL/L — SIGNIFICANT CHANGE UP (ref 22–31)
CREAT SERPL-MCNC: 0.9 MG/DL — SIGNIFICANT CHANGE UP (ref 0.5–1.3)
GLUCOSE SERPL-MCNC: 129 MG/DL — HIGH (ref 70–99)
HCT VFR BLD CALC: 33.1 % — LOW (ref 34.5–45)
HGB BLD-MCNC: 11 G/DL — LOW (ref 11.5–15.5)
MCHC RBC-ENTMCNC: 29.8 PG — SIGNIFICANT CHANGE UP (ref 27–34)
MCHC RBC-ENTMCNC: 33.2 GM/DL — SIGNIFICANT CHANGE UP (ref 32–36)
MCV RBC AUTO: 89.7 FL — SIGNIFICANT CHANGE UP (ref 80–100)
NRBC # BLD: 0 /100 WBCS — SIGNIFICANT CHANGE UP (ref 0–0)
PLATELET # BLD AUTO: 169 K/UL — SIGNIFICANT CHANGE UP (ref 150–400)
POTASSIUM SERPL-MCNC: 4 MMOL/L — SIGNIFICANT CHANGE UP (ref 3.5–5.3)
POTASSIUM SERPL-SCNC: 4 MMOL/L — SIGNIFICANT CHANGE UP (ref 3.5–5.3)
RBC # BLD: 3.69 M/UL — LOW (ref 3.8–5.2)
RBC # FLD: 12.8 % — SIGNIFICANT CHANGE UP (ref 10.3–14.5)
SODIUM SERPL-SCNC: 134 MMOL/L — LOW (ref 135–145)
WBC # BLD: 9.7 K/UL — SIGNIFICANT CHANGE UP (ref 3.8–10.5)
WBC # FLD AUTO: 9.7 K/UL — SIGNIFICANT CHANGE UP (ref 3.8–10.5)

## 2018-10-04 PROCEDURE — 99232 SBSQ HOSP IP/OBS MODERATE 35: CPT

## 2018-10-04 RX ORDER — OXYCODONE HYDROCHLORIDE 5 MG/1
1 TABLET ORAL
Qty: 56 | Refills: 0
Start: 2018-10-04 | End: 2018-10-10

## 2018-10-04 RX ORDER — METOPROLOL TARTRATE 50 MG
1 TABLET ORAL
Qty: 0 | Refills: 0 | COMMUNITY

## 2018-10-04 RX ORDER — OXYCODONE HYDROCHLORIDE 5 MG/1
1 TABLET ORAL
Qty: 56 | Refills: 0 | OUTPATIENT
Start: 2018-10-04 | End: 2018-10-10

## 2018-10-04 RX ADMIN — OXYCODONE HYDROCHLORIDE 10 MILLIGRAM(S): 5 TABLET ORAL at 12:30

## 2018-10-04 RX ADMIN — OXYCODONE HYDROCHLORIDE 10 MILLIGRAM(S): 5 TABLET ORAL at 01:00

## 2018-10-04 RX ADMIN — PANTOPRAZOLE SODIUM 40 MILLIGRAM(S): 20 TABLET, DELAYED RELEASE ORAL at 05:41

## 2018-10-04 RX ADMIN — Medication 1000 MILLIGRAM(S): at 06:30

## 2018-10-04 RX ADMIN — OXYCODONE HYDROCHLORIDE 10 MILLIGRAM(S): 5 TABLET ORAL at 08:45

## 2018-10-04 RX ADMIN — Medication 25 MILLIGRAM(S): at 05:42

## 2018-10-04 RX ADMIN — OXYCODONE HYDROCHLORIDE 10 MILLIGRAM(S): 5 TABLET ORAL at 05:50

## 2018-10-04 RX ADMIN — Medication 100 MILLIGRAM(S): at 11:56

## 2018-10-04 RX ADMIN — Medication 1000 MILLIGRAM(S): at 11:55

## 2018-10-04 RX ADMIN — Medication 1000 MILLIGRAM(S): at 12:25

## 2018-10-04 RX ADMIN — AMLODIPINE BESYLATE 10 MILLIGRAM(S): 2.5 TABLET ORAL at 05:42

## 2018-10-04 RX ADMIN — Medication 81 MILLIGRAM(S): at 05:42

## 2018-10-04 RX ADMIN — OXYCODONE HYDROCHLORIDE 10 MILLIGRAM(S): 5 TABLET ORAL at 06:25

## 2018-10-04 RX ADMIN — Medication 1000 MILLIGRAM(S): at 05:42

## 2018-10-04 RX ADMIN — OXYCODONE HYDROCHLORIDE 10 MILLIGRAM(S): 5 TABLET ORAL at 00:21

## 2018-10-04 RX ADMIN — OXYCODONE HYDROCHLORIDE 10 MILLIGRAM(S): 5 TABLET ORAL at 11:56

## 2018-10-04 RX ADMIN — OXYCODONE HYDROCHLORIDE 10 MILLIGRAM(S): 5 TABLET ORAL at 09:30

## 2018-10-04 RX ADMIN — Medication 100 MILLIGRAM(S): at 05:42

## 2018-10-04 NOTE — PROGRESS NOTE ADULT - SUBJECTIVE AND OBJECTIVE BOX
EFRA HERNDONITA                                                               [id number]                                                       Allergies---raw vegetables and raw fruit (Nausea)  sulfa drugs (Other)        Pt is a 77y year old Female s/p right TKR.   Pt. is A&O x 3, resting comfortably, with no complaints.   Pain is 2/10.   Tolerating the diet.   Denies chest pain / shortness of breath / dyspnea / nausea / vomiting / headaches or light headed ness.         Vital Signs Last 24 Hrs  T(F): 98.6 (10-04-18 @ 07:55), Max: 99 (10-03-18 @ 23:30)  HR: 67 (10-04-18 @ 07:55)  BP: 126/80 (10-04-18 @ 07:55)  RR: 17 (10-04-18 @ 07:55)  SpO2: 97% (10-04-18 @ 07:55)    I&O's Detail    03 Oct 2018 07:01  -  04 Oct 2018 07:00  --------------------------------------------------------  IN:    Oral Fluid: 600 mL  Total IN: 600 mL    OUT:    Drain: 250 mL    Voided: 500 mL  Total OUT: 750 mL    Total NET: -150 mL        PE:   Right Lower Extremity:   Dressing is C/D/I.   Dressing changed.   Incision is clean and dry.   The wound is closed with -----sutures.   No redness, swelling, heat, discharge or other evidence of infection, superficial or deep.   Neurovascularly intact.   No gross evidence of motor or sensory deficit.   +2 DP/PT pulses.   EHL/FHL/TA intact.   Toes are pink and mobile.   Capillary refill < 2 seconds.   Negative calf tenderness.   PAS on.                                  11.0   9.70  )--------------( 169                          10-04-18 @ 07:54               33.1         134   |  102  |  12  -----------------------------<  129                  10-04-18 @ 07:54  4.0    |  30    |  0.90        Ca    9.6              A:   Pt is a 77y year old Female S/P right total knee replacement, Post Op Day #2        Plan:    - Follow up with Medicine    - OOB with PT/OT   - Pain control    - Incentive spirometry   - Labs in A.M.   - Discharge Planning   - DVT ppx = PAS +  aspirin enteric coated 81 milliGRAM(s) Oral two times a day                                                                                                                                                                             Zachary Tirado RPA-C

## 2018-10-04 NOTE — PROGRESS NOTE ADULT - SUBJECTIVE AND OBJECTIVE BOX
Discharge medication calendar:  (ASA 81mg Qday)  Aspirin EC 81mg q12h x 6 weeks  APAP 1000mg q12h x 2-3 weeks  Meloxicam 15mg QAM x 2-3 weeks  Pantoprazole 40mg QAM x 6 weeks  Narcotic PRN  Docusate 100mg TID while taking narcotic  Miralax, Senna, or Bisacodyl PRN for treatment of constipation

## 2018-10-17 ENCOUNTER — APPOINTMENT (OUTPATIENT)
Dept: ORTHOPEDIC SURGERY | Facility: CLINIC | Age: 78
End: 2018-10-17
Payer: MEDICARE

## 2018-10-17 DIAGNOSIS — Z87.39 PERSONAL HISTORY OF OTHER DISEASES OF THE MUSCULOSKELETAL SYSTEM AND CONNECTIVE TISSUE: ICD-10-CM

## 2018-10-17 DIAGNOSIS — Z82.62 FAMILY HISTORY OF OSTEOPOROSIS: ICD-10-CM

## 2018-10-17 DIAGNOSIS — Z78.9 OTHER SPECIFIED HEALTH STATUS: ICD-10-CM

## 2018-10-17 DIAGNOSIS — Z82.61 FAMILY HISTORY OF ARTHRITIS: ICD-10-CM

## 2018-10-17 DIAGNOSIS — Z86.79 PERSONAL HISTORY OF OTHER DISEASES OF THE CIRCULATORY SYSTEM: ICD-10-CM

## 2018-10-17 PROCEDURE — 73562 X-RAY EXAM OF KNEE 3: CPT | Mod: RT

## 2018-10-17 PROCEDURE — 99024 POSTOP FOLLOW-UP VISIT: CPT

## 2018-10-20 PROBLEM — Z78.9 DOES NOT USE ILLICIT DRUGS: Status: ACTIVE | Noted: 2018-07-11

## 2018-10-20 PROBLEM — Z78.9 CURRENT NON-SMOKER: Status: ACTIVE | Noted: 2018-07-11

## 2018-10-20 PROBLEM — Z78.9 EXERCISES RARELY: Status: ACTIVE | Noted: 2018-07-11

## 2018-10-20 PROBLEM — Z87.39 HISTORY OF SPINAL STENOSIS: Status: RESOLVED | Noted: 2018-07-11 | Resolved: 2018-10-20

## 2018-10-20 PROBLEM — Z86.79 HISTORY OF HYPERTENSION: Status: RESOLVED | Noted: 2018-07-11 | Resolved: 2018-10-20

## 2018-10-20 PROBLEM — Z87.39 HISTORY OF ARTHRITIS: Status: RESOLVED | Noted: 2018-07-11 | Resolved: 2018-10-20

## 2018-10-20 PROBLEM — Z82.61 FAMILY HISTORY OF ARTHRITIS: Status: ACTIVE | Noted: 2018-07-11

## 2018-10-20 PROBLEM — Z78.9 CURRENT NON-DRINKER OF ALCOHOL: Status: ACTIVE | Noted: 2018-07-11

## 2018-10-20 PROBLEM — Z82.62 FAMILY HISTORY OF OSTEOPOROSIS: Status: ACTIVE | Noted: 2018-07-11

## 2018-11-11 PROCEDURE — C1889: CPT

## 2018-11-11 PROCEDURE — 97535 SELF CARE MNGMENT TRAINING: CPT

## 2018-11-11 PROCEDURE — 88311 DECALCIFY TISSUE: CPT

## 2018-11-11 PROCEDURE — 36415 COLL VENOUS BLD VENIPUNCTURE: CPT

## 2018-11-11 PROCEDURE — 97530 THERAPEUTIC ACTIVITIES: CPT

## 2018-11-11 PROCEDURE — 97116 GAIT TRAINING THERAPY: CPT

## 2018-11-11 PROCEDURE — 97161 PT EVAL LOW COMPLEX 20 MIN: CPT

## 2018-11-11 PROCEDURE — C1713: CPT

## 2018-11-11 PROCEDURE — 85027 COMPLETE CBC AUTOMATED: CPT

## 2018-11-11 PROCEDURE — 85018 HEMOGLOBIN: CPT

## 2018-11-11 PROCEDURE — 85014 HEMATOCRIT: CPT

## 2018-11-11 PROCEDURE — 97165 OT EVAL LOW COMPLEX 30 MIN: CPT

## 2018-11-11 PROCEDURE — 80048 BASIC METABOLIC PNL TOTAL CA: CPT

## 2018-11-11 PROCEDURE — 97110 THERAPEUTIC EXERCISES: CPT

## 2018-11-11 PROCEDURE — 73562 X-RAY EXAM OF KNEE 3: CPT

## 2018-11-11 PROCEDURE — 94664 DEMO&/EVAL PT USE INHALER: CPT

## 2018-11-11 PROCEDURE — C1776: CPT

## 2018-11-11 PROCEDURE — 88305 TISSUE EXAM BY PATHOLOGIST: CPT

## 2018-11-14 ENCOUNTER — APPOINTMENT (OUTPATIENT)
Dept: ORTHOPEDIC SURGERY | Facility: CLINIC | Age: 78
End: 2018-11-14
Payer: MEDICARE

## 2018-11-14 VITALS
BODY MASS INDEX: 33.29 KG/M2 | WEIGHT: 199.8 LBS | DIASTOLIC BLOOD PRESSURE: 76 MMHG | HEART RATE: 80 BPM | HEIGHT: 65 IN | SYSTOLIC BLOOD PRESSURE: 119 MMHG

## 2018-11-14 PROCEDURE — 99024 POSTOP FOLLOW-UP VISIT: CPT

## 2018-11-29 ENCOUNTER — MOBILE ON CALL (OUTPATIENT)
Age: 78
End: 2018-11-29

## 2018-12-12 ENCOUNTER — APPOINTMENT (OUTPATIENT)
Dept: ORTHOPEDIC SURGERY | Facility: CLINIC | Age: 78
End: 2018-12-12
Payer: MEDICARE

## 2018-12-12 PROCEDURE — 99024 POSTOP FOLLOW-UP VISIT: CPT

## 2019-01-01 NOTE — PATIENT PROFILE ADULT. - DOES PATIENT HAVE ADVANCE DIRECTIVE
SARI is a 5 day old male infant.   -4  Para-1  Delivery-  Maternal medications-No   Nicu stay-No  Breech presentation-No  ETOH, smoking or drug use during pregnancy--No  Full term--Yes   Prenatal history normal --   Gestational diabetes-No   Preeclampsia--No   Maternal std--No   Steroids given --No   GBS+--No   Birth weight--3.664  Discharge weight --3.45      Feeding:  Formula   First Void:  positive  First Stool:  positive    History reviewed. No pertinent past medical history.    PHYSICAL EXAM  VITALS:    Visit Vitals  Temp 99.3 °F (37.4 °C) (Temporal)   Ht 20.87\" (53 cm)   Wt 3.36 kg (7 lb 6.5 oz)   HC 37 cm (14.57\")   BMI 11.96 kg/m²     WEIGHT CHANGE SINCE BIRTH:  -8%  GENERAL:  SARI is an alert, vigorous male with appropriate behavior. He is in no acute distress.  SKIN:  His skin is warm with normal turgor.  The color of the skin is pink. There is no rash.  There are no bruises or other signs of injury.  No significant jaundice.  HEAD:  The head is atraumatic and normocephalic.  The anterior fontanel is open and flat; the posterior fontanel is open.  EYES:  The conjunctivae appear normal with neither icterus nor subconjunctival hemorrhage.  Red reflexes are seen bilaterally.  EARS:  Pinnae and external ear canals normal.  NOSE:  There is no nasal flaring, nares patent bilaterally.  THROAT:  The oropharynx is normal.  There is no cleft of the palate.  NECK:  Clavicles without crepitus.  TRUNK AND THORAX:  There are no lesions on the trunk; there is no dimple over the presacral area.  There are no retractions.  LUNGS:  The lung fields are clear to auscultation.  HEART:  The precordium is quiet.  The heart rhythm is grossly regular.  S1 and S2 are normal.  There are no murmurs.  The femoral pulses are normal.  ABDOMEN:  The umbilical cord stump is normal.  There is not an umbilical hernia.  The abdomen is flat and soft.   GENITALIA:  normal male genitalia with bilateral descended testes    RECTAL:  Anus patent.  EXTREMITIES:  Moving all 4 extremities.  The hip exam is normal.  There are no hip clicks or clunks.    NEUROLOGIC:  he displays normal tone throughout.  He is not jittery and he has normal  reflexes.  [place] reflex present.    ASSESSMENT/PLAN:    Well 5 day old male infant.          • Education and instructions Parent verbalizes understanding of information presented.  Parent denies further questions.  • Clinical summary provided to patient.    Discussed with patient's family/guardian the risks of leaving infant unattended on a high surface (sofa, bed, table) from which the infant could fall.    Discussed with patient's family/guardian water safety for neonates.  Recommendations included keeping hot water heater set at 120 degrees farenheit or lower and to never leave infant unattended while in a bath.    The natural course, complications, and treatment options of jaundice were discussed. Discussed that the chemical bilirubin is responsible for jaundice. Discussed benefits of increased fluid intake and increased stool output.  Discussed benefits of sunbathing  to reduce jaundice as well as to observation for increased body temperature while sunbathing.  Importance of follow up of bilirubin levels, as needed, was discussed.    Discussed with patient's family/guardian the importance of  car seat use.  Instructions for use of car seat backwards in a back seat until 1 year of age and weight of 20 pounds.    Discussed with patient's family/guardian current recommendation for sleep are to have infants sleep on their back without pillows, blankets, or other soft objects with them.    Discussed with patient's family/guardian fever in the .  Discussed that rectal fever of 100.5 or higher could be indicitive of a serious illness in infants under 2 months old.   Patient's family/guardian to contact our office at the time of fever for furhter evaluation.  Discussed with family that fever in  the  evaluation of is likely to include blood count, blood culture, urinalysis, urine cs, spinal fluid analysis, spinal fluid culture, and hospital admission for IV antibiotics until cultures negative for 48 hours.  Family was notified not to use tylenol until 2 month check up.  Advised r/t second hand smoke and no screen time   advised to NEVER sleep with the baby - MUST use a crib      advised if any signs of illness or temp 100.0 or greater - contact the office to be seen.            No

## 2019-01-09 ENCOUNTER — APPOINTMENT (OUTPATIENT)
Dept: ORTHOPEDIC SURGERY | Facility: CLINIC | Age: 79
End: 2019-01-09
Payer: MEDICARE

## 2019-01-09 DIAGNOSIS — Z96.641 PRESENCE OF RIGHT ARTIFICIAL HIP JOINT: ICD-10-CM

## 2019-01-09 PROCEDURE — 99213 OFFICE O/P EST LOW 20 MIN: CPT

## 2019-01-09 NOTE — HISTORY OF PRESENT ILLNESS
[Joint Pain] : joint pain [Joint Stiffness] : no joint stiffness [Joint Swelling] : no joint swelling [Muscle Aches] : no muscle aches [FreeTextEntry1] : Right TKA 10/2/2018. [FreeTextEntry2] : 78 year old female 3 months s/p right TKA. She notes improvement in preoperative pain, with some mild residual anterior knee pain present. There is no locking or giving out. She reports a 2 year history of left hip/groin pain secondary to iliopsoas tendinitis, and wants to consider an injection. She does have a hx of back pain, and she will now consider seeing a back doctor.

## 2019-01-09 NOTE — DISCUSSION/SUMMARY
[de-identified] : 78 year old female s/p Right TKA 10/2/2018, with right iliopsoas tendinitis s/p right THR. She will continue low impact home exercises for her right knee. For her hip, I referred her for a guided injection. Reexam 2 wks after injection.

## 2019-01-09 NOTE — PHYSICAL EXAM
[FreeTextEntry2] : Well-nourished female, in no acute distress\par Alert and oriented to time, place and person\par Skin: no lesions discoloration\par Respirations: unlabored\par Cardiac: no leg swelling\par Lymphatic: no groin adenopathy \par Right knee: midline incision well healed, ROM 5-100 degrees, ligaments intact.\par Right Hip: resisted right hip flexion 5/5 provokes groin pain

## 2019-02-06 ENCOUNTER — APPOINTMENT (OUTPATIENT)
Dept: ORTHOPEDIC SURGERY | Facility: CLINIC | Age: 79
End: 2019-02-06
Payer: MEDICARE

## 2019-02-06 DIAGNOSIS — M70.71 OTHER BURSITIS OF HIP, RIGHT HIP: ICD-10-CM

## 2019-02-06 PROCEDURE — 99212 OFFICE O/P EST SF 10 MIN: CPT

## 2019-02-06 NOTE — ADDENDUM
[FreeTextEntry1] : I, Trino Jayda, acted solely as a scribe for Dr. Brian Hopkins on 02/06/2019 .\par \par All medical record entries made by the scribe were at my, Dr. Brian Hopkins, direction and personally dictated by me on 02/06/2019 . I have reviewed the chart and agree that the record accurately reflects my personal performance of the history, physical exam, assessment and plan. I have also personally directed, reviewed, and agreed with the chart.

## 2019-02-06 NOTE — DISCUSSION/SUMMARY
[de-identified] : 78 year old female s/p Right TKA 10/2/2018, with right iliopsoas tendinitis s/p right THR. She will continue low impact home exercises for her right knee. FU in 3 months.

## 2019-02-06 NOTE — HISTORY OF PRESENT ILLNESS
[de-identified] : 78 year old female presents s/p right total knee replacement October 2, 2018. She has overall improvement in preoperative pain. There is residual swelling, but no locking, or giving out. She had a guided injection for right iliopsoas tendinitis, and notes improvement in pain to the hip/groin as well.

## 2019-02-06 NOTE — PHYSICAL EXAM
[de-identified] : Well-nourished female, in no acute distress\par Alert and oriented to time, place and person\par Skin: no lesions discoloration\par Respirations: unlabored\par Cardiac: no leg swelling\par Lymphatic: no groin adenopathy \par Right knee: midline incision well healed, ROM 5-100 degrees, ligaments intact, neutral alignment no effusion\par Right Hip: resisted right hip flexion 5/5 pain free

## 2019-04-10 ENCOUNTER — APPOINTMENT (OUTPATIENT)
Dept: ORTHOPEDIC SURGERY | Facility: CLINIC | Age: 79
End: 2019-04-10
Payer: MEDICARE

## 2019-04-10 PROCEDURE — 99212 OFFICE O/P EST SF 10 MIN: CPT

## 2019-04-10 NOTE — HISTORY OF PRESENT ILLNESS
[de-identified] : 78 year old female presents for an evaluation of right knee pain s/p TKR 10/2/2018. She reports swelling and clicking of her right knee, but denies locking and giving out. She reports pain at night.

## 2019-04-10 NOTE — PHYSICAL EXAM
[de-identified] : Well-nourished, in no acute distress\par Alert and oriented to time, place and person\par Skin: no lesions discoloration\par Respirations: unlabored\par Cardiac: no leg swelling\par Lymphatic: no groin adenopathy\par right knee:no effusion, range of motion 5-100 degrees, Terminal extension crepitus/clunking\par \par  [de-identified] : No new images were taken in the office today.\par

## 2019-04-10 NOTE — ADDENDUM
[FreeTextEntry1] : I, Cj Nuñez, acted solely as a scribe for Dr. Brian Hopkins on 04/10/2019  .\par  \par All medical record entries made by the scribe were at my, Dr. Brian Hopkins, direction and personally dictated by me on 04/10/2019. I have reviewed the chart and agree that the record accurately reflects my personal performance of the history, physical exam, assessment and plan. I have also personally directed, reviewed, and agreed with the chart.\par

## 2019-04-10 NOTE — DISCUSSION/SUMMARY
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 We discussed the nature of the condition and treatment options. I discussed the risks, benefits, and alternatives to arthroscopy as a final resort. FU 2 months.

## 2019-04-10 NOTE — CONSULT LETTER
[Dear  ___] : Dear  [unfilled], [Sincerely,] : Sincerely, [Please see my note below.] : Please see my note below. [FreeTextEntry3] : Dr. Hopkins

## 2019-05-08 ENCOUNTER — APPOINTMENT (OUTPATIENT)
Dept: ORTHOPEDIC SURGERY | Facility: CLINIC | Age: 79
End: 2019-05-08

## 2019-06-12 ENCOUNTER — APPOINTMENT (OUTPATIENT)
Dept: ORTHOPEDIC SURGERY | Facility: CLINIC | Age: 79
End: 2019-06-12
Payer: MEDICARE

## 2019-06-12 PROCEDURE — 99212 OFFICE O/P EST SF 10 MIN: CPT

## 2019-06-12 NOTE — ADDENDUM
[FreeTextEntry1] : I, Cj Nuñez, acted solely as a scribe for Dr. Brian Hopkins on 06/12/2019  .\par  \par All medical record entries made by the scribe were at my, Dr. Brian Hopkins, direction and personally dictated by me on 06/12/2019. I have reviewed the chart and agree that the record accurately reflects my personal performance of the history, physical exam, assessment and plan. I have also personally directed, reviewed, and agreed with the chart.\par

## 2019-06-12 NOTE — PHYSICAL EXAM
[de-identified] : No distress\par Right knee healed neutral no effusion and flexion are 0/1:15 terminal extension crepitus or palpitation of pain ligaments intact neurovascular intact [de-identified] : No new images were taken in the office today.\par

## 2019-06-12 NOTE — HISTORY OF PRESENT ILLNESS
[de-identified] : 78 year old female presents s/p right TKR October 2nd, 2018. She states when she transitions from sitting to standing, her knee crunches and has pain. Once the patient is ambulating, the pain resides.

## 2019-06-12 NOTE — DISCUSSION/SUMMARY
[de-identified] : The patient presents with patella clunk syndrome.  We discussed the nature of the condition and treatment options. We discussed the possibility of arthroscopy, the risks benefits and alternatives to this procedure. \par

## 2019-08-16 RX ORDER — METOPROLOL TARTRATE 50 MG
1 TABLET ORAL
Qty: 0 | Refills: 0 | DISCHARGE

## 2019-08-20 ENCOUNTER — OUTPATIENT (OUTPATIENT)
Dept: OUTPATIENT SERVICES | Facility: HOSPITAL | Age: 79
LOS: 1 days | End: 2019-08-20
Payer: MEDICARE

## 2019-08-20 VITALS
OXYGEN SATURATION: 99 % | TEMPERATURE: 98 F | HEIGHT: 64 IN | HEART RATE: 70 BPM | RESPIRATION RATE: 14 BRPM | DIASTOLIC BLOOD PRESSURE: 88 MMHG | WEIGHT: 202.83 LBS | SYSTOLIC BLOOD PRESSURE: 130 MMHG

## 2019-08-20 DIAGNOSIS — Z96.643 PRESENCE OF ARTIFICIAL HIP JOINT, BILATERAL: Chronic | ICD-10-CM

## 2019-08-20 DIAGNOSIS — Z96.651 PRESENCE OF RIGHT ARTIFICIAL KNEE JOINT: Chronic | ICD-10-CM

## 2019-08-20 DIAGNOSIS — Z01.818 ENCOUNTER FOR OTHER PREPROCEDURAL EXAMINATION: ICD-10-CM

## 2019-08-20 DIAGNOSIS — M25.869 OTHER SPECIFIED JOINT DISORDERS, UNSPECIFIED KNEE: ICD-10-CM

## 2019-08-20 DIAGNOSIS — Z98.89 OTHER SPECIFIED POSTPROCEDURAL STATES: Chronic | ICD-10-CM

## 2019-08-20 LAB
ANION GAP SERPL CALC-SCNC: 8 MMOL/L — SIGNIFICANT CHANGE UP (ref 5–17)
BUN SERPL-MCNC: 13 MG/DL — SIGNIFICANT CHANGE UP (ref 7–23)
CALCIUM SERPL-MCNC: 10.5 MG/DL — SIGNIFICANT CHANGE UP (ref 8.4–10.5)
CHLORIDE SERPL-SCNC: 107 MMOL/L — SIGNIFICANT CHANGE UP (ref 96–108)
CO2 SERPL-SCNC: 26 MMOL/L — SIGNIFICANT CHANGE UP (ref 22–31)
CREAT SERPL-MCNC: 0.87 MG/DL — SIGNIFICANT CHANGE UP (ref 0.5–1.3)
GLUCOSE SERPL-MCNC: 97 MG/DL — SIGNIFICANT CHANGE UP (ref 70–99)
HCT VFR BLD CALC: 45.1 % — HIGH (ref 34.5–45)
HGB BLD-MCNC: 14.1 G/DL — SIGNIFICANT CHANGE UP (ref 11.5–15.5)
MCHC RBC-ENTMCNC: 28.5 PG — SIGNIFICANT CHANGE UP (ref 27–34)
MCHC RBC-ENTMCNC: 31.3 GM/DL — LOW (ref 32–36)
MCV RBC AUTO: 91.1 FL — SIGNIFICANT CHANGE UP (ref 80–100)
NRBC # BLD: 0 /100 WBCS — SIGNIFICANT CHANGE UP (ref 0–0)
PLATELET # BLD AUTO: 196 K/UL — SIGNIFICANT CHANGE UP (ref 150–400)
POTASSIUM SERPL-MCNC: 3.6 MMOL/L — SIGNIFICANT CHANGE UP (ref 3.5–5.3)
POTASSIUM SERPL-SCNC: 3.6 MMOL/L — SIGNIFICANT CHANGE UP (ref 3.5–5.3)
RBC # BLD: 4.95 M/UL — SIGNIFICANT CHANGE UP (ref 3.8–5.2)
RBC # FLD: 13.2 % — SIGNIFICANT CHANGE UP (ref 10.3–14.5)
SODIUM SERPL-SCNC: 141 MMOL/L — SIGNIFICANT CHANGE UP (ref 135–145)
WBC # BLD: 4.95 K/UL — SIGNIFICANT CHANGE UP (ref 3.8–10.5)
WBC # FLD AUTO: 4.95 K/UL — SIGNIFICANT CHANGE UP (ref 3.8–10.5)

## 2019-08-20 PROCEDURE — 36415 COLL VENOUS BLD VENIPUNCTURE: CPT

## 2019-08-20 PROCEDURE — 93005 ELECTROCARDIOGRAM TRACING: CPT

## 2019-08-20 PROCEDURE — G0463: CPT

## 2019-08-20 PROCEDURE — 80048 BASIC METABOLIC PNL TOTAL CA: CPT

## 2019-08-20 PROCEDURE — 85027 COMPLETE CBC AUTOMATED: CPT

## 2019-08-20 PROCEDURE — 93010 ELECTROCARDIOGRAM REPORT: CPT

## 2019-08-20 NOTE — H&P PST ADULT - MUSCULOSKELETAL COMMENTS
right knee stiffness  , scar tissue after surgery Right knee tender on palpation right knee stiffness, pain

## 2019-08-20 NOTE — H&P PST ADULT - NSICDXFAMILYHX_GEN_ALL_CORE_FT
FAMILY HISTORY:  Sibling  Still living? No  Family history of lung cancer, Age at diagnosis: Age Unknown  Family history of stroke, Age at diagnosis: Age Unknown

## 2019-08-20 NOTE — H&P PST ADULT - VENOUS THROMBOEMBOLISM CURRENT STATUS
(1) other risk factor (includes escalating BMI, pack-years of smoking, diabetes requiring insulin, chemotherapy, female gender and length of surgery)/(0) indicator not present

## 2019-08-20 NOTE — H&P PST ADULT - NSICDXPASTMEDICALHX_GEN_ALL_CORE_FT
PAST MEDICAL HISTORY:  BMI 33.0-33.9,adult     Diverticulitis     Essential hypertension     Hyperlipidemia     Obesity (BMI 30.0-34.9)     Osteoarthritis of right knee     Pain from implanted hardware right patella PAST MEDICAL HISTORY:  Diverticulitis     Essential hypertension     Hyperlipidemia     Obesity (BMI 30.0-34.9)     Osteoarthritis of right knee     Pain from implanted hardware right knee stiffness    PVC (premature ventricular contraction)

## 2019-08-20 NOTE — H&P PST ADULT - NSICDXPROBLEM_GEN_ALL_CORE_FT
R/O PROBLEM DIAGNOSES  Problem: Abnormal EKG  Assessment and Plan: NSR with PVC   PCP will address the EKG in the clearance   Old EKG in the chart   will obtain stress and echo report  from cardiologist which was done 2018 before knee replacement last year     Problem: Knee pain  Assessment and Plan: Right knee arthroscopy   Medical clearance   Pre op instructions

## 2019-08-20 NOTE — H&P PST ADULT - NSICDXPASTSURGICALHX_GEN_ALL_CORE_FT
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

## 2019-08-20 NOTE — H&P PST ADULT - HISTORY OF PRESENT ILLNESS
79 yo female who had undergone RIGHT total knee replacement on 10/2/18 presents with complaint of right knee pain, clicking,  stiffness, inability to bend the knee fully . Tried PT with no relief,. Evaluted by Sandeep referred for surgery due to scar tissue build up . scheduled for right knee patella cluck arthroscopic debridement on 9/3/19 77 yo female who had undergone RIGHT total knee replacement on 10/2/18 presents with complaint of right knee pain, stiffness, inability to bend the knee fully .Reports right knee swelling and clicking  Tried PT with no relief,. Evaluted by Sandeep referred for surgery due to scar tissue build up as per pt . scheduled for right knee patella cluck arthroscopic debridement on 9/3/19

## 2019-08-20 NOTE — H&P PST ADULT - ASSESSMENT
78 yo female is scheduled for RIGHT total kneearthroscopy 76 yo female is scheduled for RIGHT total knee arthroscopy

## 2019-08-22 RX ORDER — SODIUM CHLORIDE 9 MG/ML
1000 INJECTION, SOLUTION INTRAVENOUS
Refills: 0 | Status: DISCONTINUED | OUTPATIENT
Start: 2019-09-03 | End: 2019-09-04

## 2019-08-28 ENCOUNTER — APPOINTMENT (OUTPATIENT)
Dept: ORTHOPEDIC SURGERY | Facility: CLINIC | Age: 79
End: 2019-08-28
Payer: MEDICARE

## 2019-08-28 PROCEDURE — 73564 X-RAY EXAM KNEE 4 OR MORE: CPT | Mod: RT

## 2019-08-28 PROCEDURE — 99211 OFF/OP EST MAY X REQ PHY/QHP: CPT

## 2019-09-03 ENCOUNTER — INPATIENT (INPATIENT)
Facility: HOSPITAL | Age: 79
LOS: 0 days | Discharge: HOME | DRG: 502 | End: 2019-09-04
Attending: ORTHOPAEDIC SURGERY | Admitting: ORTHOPAEDIC SURGERY
Payer: MEDICARE

## 2019-09-03 ENCOUNTER — APPOINTMENT (OUTPATIENT)
Dept: ORTHOPEDIC SURGERY | Facility: HOSPITAL | Age: 79
End: 2019-09-03

## 2019-09-03 ENCOUNTER — TRANSCRIPTION ENCOUNTER (OUTPATIENT)
Age: 79
End: 2019-09-03

## 2019-09-03 ENCOUNTER — RESULT REVIEW (OUTPATIENT)
Age: 79
End: 2019-09-03

## 2019-09-03 VITALS
RESPIRATION RATE: 25 BRPM | OXYGEN SATURATION: 99 % | DIASTOLIC BLOOD PRESSURE: 86 MMHG | HEART RATE: 66 BPM | SYSTOLIC BLOOD PRESSURE: 131 MMHG | TEMPERATURE: 98 F | WEIGHT: 203.27 LBS | HEIGHT: 64 IN

## 2019-09-03 DIAGNOSIS — Z96.651 PRESENCE OF RIGHT ARTIFICIAL KNEE JOINT: Chronic | ICD-10-CM

## 2019-09-03 DIAGNOSIS — M25.869 OTHER SPECIFIED JOINT DISORDERS, UNSPECIFIED KNEE: ICD-10-CM

## 2019-09-03 DIAGNOSIS — Z98.89 OTHER SPECIFIED POSTPROCEDURAL STATES: Chronic | ICD-10-CM

## 2019-09-03 DIAGNOSIS — Z96.643 PRESENCE OF ARTIFICIAL HIP JOINT, BILATERAL: Chronic | ICD-10-CM

## 2019-09-03 DIAGNOSIS — Z01.818 ENCOUNTER FOR OTHER PREPROCEDURAL EXAMINATION: ICD-10-CM

## 2019-09-03 PROCEDURE — 99223 1ST HOSP IP/OBS HIGH 75: CPT

## 2019-09-03 PROCEDURE — 29884 ARTHRS KNEE SURG LYSIS ADS: CPT | Mod: RT

## 2019-09-03 PROCEDURE — 88304 TISSUE EXAM BY PATHOLOGIST: CPT | Mod: 26

## 2019-09-03 RX ORDER — DOCUSATE SODIUM 100 MG
1 CAPSULE ORAL
Qty: 0 | Refills: 0 | DISCHARGE
Start: 2019-09-03

## 2019-09-03 RX ORDER — ATORVASTATIN CALCIUM 80 MG/1
10 TABLET, FILM COATED ORAL AT BEDTIME
Refills: 0 | Status: DISCONTINUED | OUTPATIENT
Start: 2019-09-03 | End: 2019-09-04

## 2019-09-03 RX ORDER — ACETAMINOPHEN 500 MG
1000 TABLET ORAL EVERY 8 HOURS
Refills: 0 | Status: DISCONTINUED | OUTPATIENT
Start: 2019-09-04 | End: 2019-09-04

## 2019-09-03 RX ORDER — OXYCODONE HYDROCHLORIDE 5 MG/1
5 TABLET ORAL
Refills: 0 | Status: DISCONTINUED | OUTPATIENT
Start: 2019-09-03 | End: 2019-09-04

## 2019-09-03 RX ORDER — DOCUSATE SODIUM 100 MG
100 CAPSULE ORAL THREE TIMES A DAY
Refills: 0 | Status: DISCONTINUED | OUTPATIENT
Start: 2019-09-03 | End: 2019-09-04

## 2019-09-03 RX ORDER — OXYCODONE AND ACETAMINOPHEN 5; 325 MG/1; MG/1
1 TABLET ORAL EVERY 4 HOURS
Refills: 0 | Status: DISCONTINUED | OUTPATIENT
Start: 2019-09-03 | End: 2019-09-03

## 2019-09-03 RX ORDER — POLYETHYLENE GLYCOL 3350 17 G/17G
17 POWDER, FOR SOLUTION ORAL
Qty: 0 | Refills: 0 | DISCHARGE
Start: 2019-09-03

## 2019-09-03 RX ORDER — ONDANSETRON 8 MG/1
4 TABLET, FILM COATED ORAL ONCE
Refills: 0 | Status: DISCONTINUED | OUTPATIENT
Start: 2019-09-03 | End: 2019-09-03

## 2019-09-03 RX ORDER — ONDANSETRON 8 MG/1
4 TABLET, FILM COATED ORAL EVERY 6 HOURS
Refills: 0 | Status: DISCONTINUED | OUTPATIENT
Start: 2019-09-03 | End: 2019-09-04

## 2019-09-03 RX ORDER — CEFAZOLIN SODIUM 1 G
2000 VIAL (EA) INJECTION ONCE
Refills: 0 | Status: COMPLETED | OUTPATIENT
Start: 2019-09-03 | End: 2019-09-03

## 2019-09-03 RX ORDER — ACETAMINOPHEN 500 MG
1000 TABLET ORAL EVERY 6 HOURS
Refills: 0 | Status: COMPLETED | OUTPATIENT
Start: 2019-09-03 | End: 2019-09-03

## 2019-09-03 RX ORDER — ASPIRIN/CALCIUM CARB/MAGNESIUM 324 MG
81 TABLET ORAL EVERY 12 HOURS
Refills: 0 | Status: DISCONTINUED | OUTPATIENT
Start: 2019-09-04 | End: 2019-09-04

## 2019-09-03 RX ORDER — CEFAZOLIN SODIUM 1 G
2000 VIAL (EA) INJECTION EVERY 8 HOURS
Refills: 0 | Status: COMPLETED | OUTPATIENT
Start: 2019-09-03 | End: 2019-09-04

## 2019-09-03 RX ORDER — HYDROMORPHONE HYDROCHLORIDE 2 MG/ML
0.5 INJECTION INTRAMUSCULAR; INTRAVENOUS; SUBCUTANEOUS
Refills: 0 | Status: DISCONTINUED | OUTPATIENT
Start: 2019-09-03 | End: 2019-09-03

## 2019-09-03 RX ORDER — SODIUM CHLORIDE 9 MG/ML
1000 INJECTION, SOLUTION INTRAVENOUS
Refills: 0 | Status: DISCONTINUED | OUTPATIENT
Start: 2019-09-03 | End: 2019-09-03

## 2019-09-03 RX ORDER — PANTOPRAZOLE SODIUM 20 MG/1
1 TABLET, DELAYED RELEASE ORAL
Qty: 30 | Refills: 0
Start: 2019-09-03 | End: 2019-10-02

## 2019-09-03 RX ORDER — AMLODIPINE BESYLATE 2.5 MG/1
10 TABLET ORAL DAILY
Refills: 0 | Status: DISCONTINUED | OUTPATIENT
Start: 2019-09-03 | End: 2019-09-04

## 2019-09-03 RX ORDER — SENNA PLUS 8.6 MG/1
2 TABLET ORAL
Qty: 0 | Refills: 0 | DISCHARGE
Start: 2019-09-03

## 2019-09-03 RX ORDER — METOPROLOL TARTRATE 50 MG
50 TABLET ORAL DAILY
Refills: 0 | Status: DISCONTINUED | OUTPATIENT
Start: 2019-09-03 | End: 2019-09-04

## 2019-09-03 RX ORDER — ASPIRIN/CALCIUM CARB/MAGNESIUM 324 MG
1 TABLET ORAL
Qty: 56 | Refills: 0
Start: 2019-09-03 | End: 2019-09-30

## 2019-09-03 RX ORDER — OXYCODONE HYDROCHLORIDE 5 MG/1
10 TABLET ORAL
Refills: 0 | Status: DISCONTINUED | OUTPATIENT
Start: 2019-09-03 | End: 2019-09-04

## 2019-09-03 RX ORDER — CHLORHEXIDINE GLUCONATE 213 G/1000ML
1 SOLUTION TOPICAL ONCE
Refills: 0 | Status: COMPLETED | OUTPATIENT
Start: 2019-09-03 | End: 2019-09-03

## 2019-09-03 RX ORDER — PANTOPRAZOLE SODIUM 20 MG/1
40 TABLET, DELAYED RELEASE ORAL
Refills: 0 | Status: DISCONTINUED | OUTPATIENT
Start: 2019-09-03 | End: 2019-09-04

## 2019-09-03 RX ORDER — METOPROLOL TARTRATE 50 MG
1 TABLET ORAL
Qty: 0 | Refills: 0 | DISCHARGE

## 2019-09-03 RX ORDER — POLYETHYLENE GLYCOL 3350 17 G/17G
17 POWDER, FOR SOLUTION ORAL DAILY
Refills: 0 | Status: DISCONTINUED | OUTPATIENT
Start: 2019-09-03 | End: 2019-09-04

## 2019-09-03 RX ORDER — SENNA PLUS 8.6 MG/1
2 TABLET ORAL AT BEDTIME
Refills: 0 | Status: DISCONTINUED | OUTPATIENT
Start: 2019-09-03 | End: 2019-09-04

## 2019-09-03 RX ORDER — MAGNESIUM HYDROXIDE 400 MG/1
30 TABLET, CHEWABLE ORAL DAILY
Refills: 0 | Status: DISCONTINUED | OUTPATIENT
Start: 2019-09-03 | End: 2019-09-04

## 2019-09-03 RX ORDER — ACETAMINOPHEN 500 MG
2 TABLET ORAL
Qty: 0 | Refills: 0 | DISCHARGE
Start: 2019-09-03

## 2019-09-03 RX ORDER — HYDROMORPHONE HYDROCHLORIDE 2 MG/ML
0.5 INJECTION INTRAMUSCULAR; INTRAVENOUS; SUBCUTANEOUS
Refills: 0 | Status: DISCONTINUED | OUTPATIENT
Start: 2019-09-03 | End: 2019-09-04

## 2019-09-03 RX ADMIN — Medication 400 MILLIGRAM(S): at 17:50

## 2019-09-03 RX ADMIN — Medication 100 MILLIGRAM(S): at 17:25

## 2019-09-03 RX ADMIN — Medication 400 MILLIGRAM(S): at 11:37

## 2019-09-03 RX ADMIN — CHLORHEXIDINE GLUCONATE 1 APPLICATION(S): 213 SOLUTION TOPICAL at 07:13

## 2019-09-03 RX ADMIN — SODIUM CHLORIDE 100 MILLILITER(S): 9 INJECTION, SOLUTION INTRAVENOUS at 11:26

## 2019-09-03 RX ADMIN — Medication 1000 MILLIGRAM(S): at 11:58

## 2019-09-03 RX ADMIN — ATORVASTATIN CALCIUM 10 MILLIGRAM(S): 80 TABLET, FILM COATED ORAL at 21:34

## 2019-09-03 RX ADMIN — Medication 100 MILLIGRAM(S): at 21:34

## 2019-09-03 RX ADMIN — Medication 1000 MILLIGRAM(S): at 18:05

## 2019-09-03 NOTE — OCCUPATIONAL THERAPY INITIAL EVALUATION ADULT - RANGE OF MOTION EXAMINATION, UPPER EXTREMITY
Fine motor skills: WFL's/bilateral UE Active ROM was WFL  (within functional limits) bilateral UE Active ROM was WFL  (within functional limits)

## 2019-09-03 NOTE — DISCHARGE NOTE PROVIDER - INSTRUCTIONS
regular diet	  Pain medicine has been prescribed for you, as needed, and it often causes constipation.    For Constipation :   • Increase your water intake. Drink at least 8 glasses of water daily.  • Try adding fiber to your diet by eating fruits, vegetables and foods that are rich in grains.  • If you do experience constipation, you may take an over-the-counter stool softener/laxative such as Colace, Senokot or  Milk of Magnesia.

## 2019-09-03 NOTE — BRIEF OPERATIVE NOTE - NSICDXBRIEFPOSTOP_GEN_ALL_CORE_FT
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

## 2019-09-03 NOTE — DISCHARGE NOTE PROVIDER - NSDCCPCAREPLAN_GEN_ALL_CORE_FT
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  Full weight bearing as tolerated  Range of Motion Goals: Flexion 120 degrees; Extension 0 degrees  Keep incisions clean and dry.

## 2019-09-03 NOTE — DISCHARGE NOTE PROVIDER - HOSPITAL COURSE
This patient was admitted to [facility] with a history of patella clunk syndrome s/p Right TKR.  Patient went to Pre-Surgical Testing at [facility] and was medically cleared to undergo elective procedure. Patient underwent right knee arthroscopy and debriedement by Dr. Brian Hopkins on 9/3/19. Procedure was well tolerated.  No operative or ayla-operative complications arose during patients hospital course.  Patient received antibiotic according to SCIP guidelines for infection prevention.  Aspirin was given for DVT prophylaxis.  Anesthesia, Medical Hospitalist, Physical Therapy and Occupational Therapy were consulted. Patient is stable for discharge with a good prognosis.  Appropriate discharge instructions and medications are provided in this document.

## 2019-09-03 NOTE — PRE-OP CHECKLIST - HEIGHT IN INCHES
----- Message from Art Jean sent at 9/18/2018  7:58 AM CDT -----  Contact: pt wife  Caller is requesting a call back from the nurse in regards to knowing if the pt should eat before his apt  [phone]   4

## 2019-09-03 NOTE — PHYSICAL THERAPY INITIAL EVALUATION ADULT - GAIT TRAINING, PT EVAL
Goals 1-2 days, Pt will ambulate 150 ft w/ rolling walker independently.    Pt will negotiate 10 steps with rail and straight cane independnetly

## 2019-09-03 NOTE — CONSULT NOTE ADULT - ASSESSMENT
77 yo female with PMH of HTN, PVC, HLD who had undergone RIGHT total knee replacement     S/p right knee patella cluck arthroscopic debridement : cont pain control with IV Dilaudid and po Oxy prn, PT, DVT ppx with ASA.    HTN: cont Amlodipine and Metoprolol with hold parameter.     HLD: cont Atorvastatin.

## 2019-09-03 NOTE — OCCUPATIONAL THERAPY INITIAL EVALUATION ADULT - IADL RETRAINING, OT EVAL
Patient will increase standing tolerance to 5-7 minutes for grooming at the sink in 2-4 sessions. Patient will increase standing tolerance to 5-7 minutes for grooming at the sink in 1-2 sessions.

## 2019-09-03 NOTE — DISCHARGE NOTE PROVIDER - NSDCFUSCHEDAPPT_GEN_ALL_CORE_FT
EFRA WHITTEN ; 09/18/2019 ; [facility] EFRA WHITTEN ; 09/18/2019 ; [facility]

## 2019-09-03 NOTE — DISCHARGE NOTE PROVIDER - NSDCCPTREATMENT_GEN_ALL_CORE_FT
PRINCIPAL PROCEDURE  Procedure: Arthroscopic debridement, knee  Findings and Treatment: right patella clunk syndrome

## 2019-09-03 NOTE — PHYSICAL THERAPY INITIAL EVALUATION ADULT - ADDITIONAL COMMENTS
Pt lives with son in a house w/ 5 steps to enter with rail, 14 steps inside to bedroom with rail.  Pt has straight cane and rolling walker

## 2019-09-03 NOTE — OCCUPATIONAL THERAPY INITIAL EVALUATION ADULT - ADL RETRAINING, OT EVAL
Patient will dress lower body with supervision, AE as needed in 2-4 sessions. Patient will dress lower body Independently , AE as needed in 1-2 sessions.

## 2019-09-03 NOTE — OCCUPATIONAL THERAPY INITIAL EVALUATION ADULT - ADDITIONAL COMMENTS
Pt lives in a house with 2 sons (1 works and 1 stays at home) with 5 steps with HR to enter. 14 steps with HR to access bedroom and bathtub with sliding doors. Pt owns a commode, rolling walker, cane and dressing stick. Pt wears progressive lenses. Pt reports that 1 son is home during the day. Pt lives in a house with son, with 5 steps with HR to enter. 14 steps with HR to access bedroom and bathtub with sliding doors. Pt owns a commode, rolling walker, cane and hip kit.

## 2019-09-03 NOTE — CONSULT NOTE ADULT - SUBJECTIVE AND OBJECTIVE BOX
Patient is a 78y old  Female who presents with a chief complaint of right knee arthroscopy for patella clunk syndrome s/p Right TKR (03 Sep 2019 14:56)      HPI: 77 yo female with PMH of HTN, PVC, HLD who had undergone RIGHT total knee replacement on 10/2/18 presents with complaint of right knee pain, stiffness, inability to bend the knee fully .Reports right knee swelling and clicking  Tried PT with no relief,. 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 No nausea, vomiting, or hematemesis; No diarrhea or constipation. No melena or hematochezia.  GENITOURINARY: No dysuria, frequency, hematuria, or incontinence  NEUROLOGICAL: No headaches, memory loss, loss of strength, numbness, or tremors  SKIN: No itching, burning, rashes, or lesions   LYMPH NODES: No enlarged glands  ENDOCRINE: No heat or cold intolerance; No hair loss  MUSCULOSKELETAL: No muscle or back pain  PSYCHIATRIC: No depression, anxiety, mood swings, or difficulty sleeping  HEME/LYMPH: No easy bruising, or bleeding gums  ALLERGY AND IMMUNOLOGIC: No hives or eczema      MEDICATIONS  (STANDING):  acetaminophen  IVPB .. 1000 milliGRAM(s) IV Intermittent every 6 hours  amLODIPine   Tablet 10 milliGRAM(s) Oral daily  atorvastatin 10 milliGRAM(s) Oral at bedtime  ceFAZolin   IVPB 2000 milliGRAM(s) IV Intermittent every 8 hours  docusate sodium 100 milliGRAM(s) Oral three times a day  lactated ringers. 1000 milliLiter(s) (75 mL/Hr) IV Continuous <Continuous>  metoprolol succinate ER 50 milliGRAM(s) Oral daily  pantoprazole    Tablet 40 milliGRAM(s) Oral before breakfast  polyethylene glycol 3350 17 Gram(s) Oral daily    MEDICATIONS  (PRN):  aluminum hydroxide/magnesium hydroxide/simethicone Suspension 30 milliLiter(s) Oral four times a day PRN Indigestion  HYDROmorphone  Injectable 0.5 milliGRAM(s) IV Push every 3 hours PRN Severe Pain (7 - 10)  magnesium hydroxide Suspension 30 milliLiter(s) Oral daily PRN Constipation  ondansetron Injectable 4 milliGRAM(s) IV Push every 6 hours PRN Nausea and/or Vomiting  oxyCODONE    IR 5 milliGRAM(s) Oral every 3 hours PRN Mild Pain (1 - 3)  oxyCODONE    IR 10 milliGRAM(s) Oral every 3 hours PRN Moderate Pain (4 - 6)  senna 2 Tablet(s) Oral at bedtime PRN Constipation      Allergies    raw vegetables and raw fruit (Nausea)  sulfa drugs (Other)    Intolerances        SOCIAL HISTORY:  Alochol: Denied  Smoking: Nonsmoker  Drug Use: Denied  Marital Status:           FAMILY HISTORY:  Family history of lung cancer (Sibling): brother  Family history of stroke (Sibling)      Vital Signs Last 24 Hrs  T(C): 36.6 (03 Sep 2019 15:28), Max: 36.7 (03 Sep 2019 10:52)  T(F): 97.9 (03 Sep 2019 15:28), Max: 98 (03 Sep 2019 10:52)  HR: 63 (03 Sep 2019 15:28) (63 - 70)  BP: 135/78 (03 Sep 2019 15:28) (115/63 - 135/78)  BP(mean): --  RR: 18 (03 Sep 2019 15:28) (16 - 25)  SpO2: 98% (03 Sep 2019 15:28) (96% - 100%)    PHYSICAL EXAM:    GENERAL: NAD, well-groomed, well-developed  HEAD:  Atraumatic, Normocephalic  EYES: EOMI, PERRLA, conjunctiva and sclera clear  ENMT: No tonsillar erythema, exudates, or enlargement; Moist mucous membranes, Good dentition, No lesions  NECK: Supple, No JVD, Normal thyroid  NERVOUS SYSTEM:  Alert & Oriented X3, Good concentration; Motor Strength 5/5 B/L upper and lower extremities; DTRs 2+ intact and symmetric  CHEST/LUNG: Clear to percussion bilaterally; No rales, rhonchi, wheezing, or rubs  HEART: Regular rate and rhythm; No murmurs, rubs, or gallops  ABDOMEN: Soft, Nontender, Nondistended; Bowel sounds present  EXTREMITIES:  2+ Peripheral Pulses, No clubbing, cyanosis, or edema  LYMPH: No lymphadenopathy noted  SKIN: No rashes or lesions  INCISION: intact    LABS:              CAPILLARY BLOOD GLUCOSE          RADIOLOGY & ADDITIONAL STUDIES:

## 2019-09-03 NOTE — OCCUPATIONAL THERAPY INITIAL EVALUATION ADULT - ADDITIONAL COMMENTS
Pt lives in a house with 2 sons (1 works and 1 stays at home) with 5 steps with HR to enter. 14 steps with HR to access bedroom and bathtub with sliding doors. Pt owns a commode, rolling walker, cane and dressing stick. Pt wears progressive lenses. Pt reports that 1 son is home during the day.

## 2019-09-03 NOTE — PROGRESS NOTE ADULT - SUBJECTIVE AND OBJECTIVE BOX
Orthopaedic Post Op Note    Procedure: Right knee arthroscopy with debridement  Surgeon: Brian Hopkins MD    78y Female comfortable, without complaints. Tolerated lunch. Reported pain score = 0  Denies N/V, CP, SOB, numbness/tingling of extremities.    PE:  Vital Signs Last 24 Hrs  T(C): 36.6 (03 Sep 2019 12:07), Max: 36.7 (03 Sep 2019 10:52)  T(F): 97.8 (03 Sep 2019 12:07), Max: 98 (03 Sep 2019 10:52)  HR: 68 (03 Sep 2019 12:22) (64 - 70)  BP: 120/70 (03 Sep 2019 12:22) (115/63 - 134/86)  RR: 18 (03 Sep 2019 12:22) (16 - 25)  SpO2: 100% (03 Sep 2019 12:22) (96% - 100%)  General: Pt alert and oriented   Lungs: + BS CTA bilaterally  Heart: +S1 & S2 heard, RRR  Abd: + BS heard, soft, NT, ND  Right Knee Dressing: C/D/I   Bilateral LEs:  Motor:   5/5 dorsiflexion, plantarflexion, EHL  Sensation intact to LT   2+ DP Pulses  SCDs in place      A/P: 78y Female POD#0 s/p Right knee arthroscopy with debridement   - Stable  - Acetaminophen, Dilaudid/Oxycodone for Pain Control   - DVT ppx: Aspirin 81mg q 12h  - Serena op IV abx: Ancef  - PT, OT per protocol  - CPM  - F/U AM Labs  DCP = home tomorrow

## 2019-09-03 NOTE — DISCHARGE NOTE PROVIDER - CARE PROVIDER_API CALL
PHOEBE Hopkins (MD)  Orthopaedic Surgery  [address] [facility] [address]  [place] [address]  Phone: [phone]  Fax: [phone]  Follow Up Time: 2 weeks

## 2019-09-03 NOTE — PROGRESS NOTE ADULT - SUBJECTIVE AND OBJECTIVE BOX
patient s/ p right knee arthroscopy for patella clunk syndrome as per Dr. Hopkins   patient to be admitted 24 hours of antibiotics and pain control patient s/ p right knee arthroscopy for patella clunk syndrome as per Dr. Hopkins   patient to be admitted 24 hours of antibiotics and pain control. As per Dr. Hopkins CPM for post op care 2ndery to adhesions.

## 2019-09-03 NOTE — BRIEF OPERATIVE NOTE - NSICDXBRIEFPREOP_GEN_ALL_CORE_FT
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

## 2019-09-03 NOTE — PHYSICAL THERAPY INITIAL EVALUATION ADULT - GAIT DEVIATIONS NOTED, PT EVAL
decreased weight-shifting ability/decreased sushant/decreased stride length/decreased velocity of limb motion/decreased step length

## 2019-09-03 NOTE — OCCUPATIONAL THERAPY INITIAL EVALUATION ADULT - TRANSFER TRAINING, PT EVAL
Patient will transfer to toilet and stall shower with DME as needed with supervision in 2-4 sessions. Patient will transfer to toilet  with DME as needed Independently  in 1-2 sessions.

## 2019-09-03 NOTE — OCCUPATIONAL THERAPY INITIAL EVALUATION ADULT - LEVEL OF INDEPENDENCE: DRESS LOWER BODY, OT EVAL
dependent (less than 25% patients effort) tba further, pt in hosp gown/dependent (less than 25% patients effort)

## 2019-09-04 ENCOUNTER — TRANSCRIPTION ENCOUNTER (OUTPATIENT)
Age: 79
End: 2019-09-04

## 2019-09-04 VITALS
SYSTOLIC BLOOD PRESSURE: 133 MMHG | HEART RATE: 71 BPM | OXYGEN SATURATION: 97 % | DIASTOLIC BLOOD PRESSURE: 76 MMHG | TEMPERATURE: 98 F | RESPIRATION RATE: 16 BRPM

## 2019-09-04 LAB
ANION GAP SERPL CALC-SCNC: 8 MMOL/L — SIGNIFICANT CHANGE UP (ref 5–17)
BUN SERPL-MCNC: 14 MG/DL — SIGNIFICANT CHANGE UP (ref 7–23)
CALCIUM SERPL-MCNC: 10.7 MG/DL — HIGH (ref 8.4–10.5)
CHLORIDE SERPL-SCNC: 106 MMOL/L — SIGNIFICANT CHANGE UP (ref 96–108)
CO2 SERPL-SCNC: 26 MMOL/L — SIGNIFICANT CHANGE UP (ref 22–31)
CREAT SERPL-MCNC: 0.98 MG/DL — SIGNIFICANT CHANGE UP (ref 0.5–1.3)
GLUCOSE SERPL-MCNC: 189 MG/DL — HIGH (ref 70–99)
HCT VFR BLD CALC: 44.5 % — SIGNIFICANT CHANGE UP (ref 34.5–45)
HGB BLD-MCNC: 14.2 G/DL — SIGNIFICANT CHANGE UP (ref 11.5–15.5)
MCHC RBC-ENTMCNC: 29.3 PG — SIGNIFICANT CHANGE UP (ref 27–34)
MCHC RBC-ENTMCNC: 31.9 GM/DL — LOW (ref 32–36)
MCV RBC AUTO: 91.9 FL — SIGNIFICANT CHANGE UP (ref 80–100)
NRBC # BLD: 0 /100 WBCS — SIGNIFICANT CHANGE UP (ref 0–0)
PLATELET # BLD AUTO: 196 K/UL — SIGNIFICANT CHANGE UP (ref 150–400)
POTASSIUM SERPL-MCNC: 4 MMOL/L — SIGNIFICANT CHANGE UP (ref 3.5–5.3)
POTASSIUM SERPL-SCNC: 4 MMOL/L — SIGNIFICANT CHANGE UP (ref 3.5–5.3)
RBC # BLD: 4.84 M/UL — SIGNIFICANT CHANGE UP (ref 3.8–5.2)
RBC # FLD: 13.3 % — SIGNIFICANT CHANGE UP (ref 10.3–14.5)
SODIUM SERPL-SCNC: 140 MMOL/L — SIGNIFICANT CHANGE UP (ref 135–145)
WBC # BLD: 12.97 K/UL — HIGH (ref 3.8–10.5)
WBC # FLD AUTO: 12.97 K/UL — HIGH (ref 3.8–10.5)

## 2019-09-04 PROCEDURE — 97161 PT EVAL LOW COMPLEX 20 MIN: CPT

## 2019-09-04 PROCEDURE — 99233 SBSQ HOSP IP/OBS HIGH 50: CPT

## 2019-09-04 PROCEDURE — 36415 COLL VENOUS BLD VENIPUNCTURE: CPT

## 2019-09-04 PROCEDURE — 97110 THERAPEUTIC EXERCISES: CPT

## 2019-09-04 PROCEDURE — 97535 SELF CARE MNGMENT TRAINING: CPT

## 2019-09-04 PROCEDURE — 97116 GAIT TRAINING THERAPY: CPT

## 2019-09-04 PROCEDURE — 94664 DEMO&/EVAL PT USE INHALER: CPT

## 2019-09-04 PROCEDURE — 85027 COMPLETE CBC AUTOMATED: CPT

## 2019-09-04 PROCEDURE — 88304 TISSUE EXAM BY PATHOLOGIST: CPT

## 2019-09-04 PROCEDURE — 80048 BASIC METABOLIC PNL TOTAL CA: CPT

## 2019-09-04 RX ORDER — OXYCODONE HYDROCHLORIDE 5 MG/1
1 TABLET ORAL
Qty: 42 | Refills: 0
Start: 2019-09-04 | End: 2019-09-10

## 2019-09-04 RX ADMIN — Medication 100 MILLIGRAM(S): at 13:07

## 2019-09-04 RX ADMIN — AMLODIPINE BESYLATE 10 MILLIGRAM(S): 2.5 TABLET ORAL at 05:54

## 2019-09-04 RX ADMIN — Medication 100 MILLIGRAM(S): at 00:34

## 2019-09-04 RX ADMIN — Medication 81 MILLIGRAM(S): at 05:54

## 2019-09-04 RX ADMIN — Medication 1000 MILLIGRAM(S): at 06:01

## 2019-09-04 RX ADMIN — Medication 50 MILLIGRAM(S): at 05:55

## 2019-09-04 RX ADMIN — Medication 1000 MILLIGRAM(S): at 13:07

## 2019-09-04 RX ADMIN — Medication 1000 MILLIGRAM(S): at 13:37

## 2019-09-04 RX ADMIN — PANTOPRAZOLE SODIUM 40 MILLIGRAM(S): 20 TABLET, DELAYED RELEASE ORAL at 05:55

## 2019-09-04 RX ADMIN — Medication 100 MILLIGRAM(S): at 05:55

## 2019-09-04 RX ADMIN — Medication 400 MILLIGRAM(S): at 00:34

## 2019-09-04 RX ADMIN — Medication 1000 MILLIGRAM(S): at 01:02

## 2019-09-04 RX ADMIN — Medication 1000 MILLIGRAM(S): at 05:54

## 2019-09-04 NOTE — PROGRESS NOTE ADULT - SUBJECTIVE AND OBJECTIVE BOX
Patient is a 78y old  Female who presents with a chief complaint of s/p Right Knee Arthroscopy for treatment of patella clunk (04 Sep 2019 07:40)      INTERVAL HPI/OVERNIGHT EVENTS:  Pt is seen and examined.  Pain is controlled.  sitting on a chair.  Pain Location & Control:     MEDICATIONS  (STANDING):  acetaminophen   Tablet .. 1000 milliGRAM(s) Oral every 8 hours  amLODIPine   Tablet 10 milliGRAM(s) Oral daily  aspirin enteric coated 81 milliGRAM(s) Oral every 12 hours  atorvastatin 10 milliGRAM(s) Oral at bedtime  docusate sodium 100 milliGRAM(s) Oral three times a day  lactated ringers. 1000 milliLiter(s) (75 mL/Hr) IV Continuous <Continuous>  metoprolol succinate ER 50 milliGRAM(s) Oral daily  pantoprazole    Tablet 40 milliGRAM(s) Oral before breakfast  polyethylene glycol 3350 17 Gram(s) Oral daily    MEDICATIONS  (PRN):  aluminum hydroxide/magnesium hydroxide/simethicone Suspension 30 milliLiter(s) Oral four times a day PRN Indigestion  HYDROmorphone  Injectable 0.5 milliGRAM(s) IV Push every 3 hours PRN Severe Pain (7 - 10)  magnesium hydroxide Suspension 30 milliLiter(s) Oral daily PRN Constipation  ondansetron Injectable 4 milliGRAM(s) IV Push every 6 hours PRN Nausea and/or Vomiting  oxyCODONE    IR 5 milliGRAM(s) Oral every 3 hours PRN Mild Pain (1 - 3)  oxyCODONE    IR 10 milliGRAM(s) Oral every 3 hours PRN Moderate Pain (4 - 6)  senna 2 Tablet(s) Oral at bedtime PRN Constipation      Allergies    raw vegetables and raw fruit (Nausea)  sulfa drugs (Other)    Intolerances            Vital Signs Last 24 Hrs  T(C): 36.5 (04 Sep 2019 07:58), Max: 36.7 (03 Sep 2019 10:52)  T(F): 97.7 (04 Sep 2019 07:58), Max: 98 (03 Sep 2019 10:52)  HR: 58 (04 Sep 2019 07:58) (58 - 70)  BP: 130/83 (04 Sep 2019 07:58) (111/68 - 135/78)  BP(mean): --  RR: 15 (04 Sep 2019 07:58) (15 - 18)  SpO2: 96% (04 Sep 2019 07:58) (96% - 100%)        LABS:                        14.2   12.97 )-----------( 196      ( 04 Sep 2019 09:42 )             44.5     04 Sep 2019 09:42    140    |  106    |  14     ----------------------------<  189    4.0     |  26     |  0.98           RADIOLOGY & ADDITIONAL TESTS:    Imaging Personally Reviewed:  [ ] YES  [ ] NO    Consultant(s) Notes Reviewed:  [ ] YES  [ ] NO    Care Discussed with Consultants/Other Providers [x ] YES  [ ] NO

## 2019-09-04 NOTE — PROGRESS NOTE ADULT - ASSESSMENT
79 yo female with PMH of HTN, PVC, HLD who had undergone RIGHT total knee replacement     S/p right knee patella cluck arthroscopic debridement : cont pain control with IV Dilaudid and po Oxy prn, PT, DVT ppx with ASA.    HTN: cont Amlodipine and Metoprolol with hold parameter.     HLD: cont Atorvastatin.     Plan of care was discuss with patient, all questions were answered, seems understand and in agreement.      Patient is medically optimized for discharge pending ortho and PT clearance.

## 2019-09-04 NOTE — DISCHARGE NOTE NURSING/CASE MANAGEMENT/SOCIAL WORK - PATIENT PORTAL LINK FT
You can access the FollowMyHealth Patient Portal offered by [facility] by registering at the following website: [email or web address]. By joining [facility]’s FollowMyHealth portal, you will also be able to view your health information using other applications (apps) compatible with our system.

## 2019-09-04 NOTE — PROGRESS NOTE ADULT - SUBJECTIVE AND OBJECTIVE BOX
POST OPERATIVE DAY #: 1  STATUS POST: Right  Knee Arthroscopy                     Patient is a 78y old  Female who presents with a chief complaint of Right knee pain and patella clunk (03 Sep 2019 18:50)    Pain well controlled, no pain in CPM at 0-55degrees.  No HA, no CP, no SOB, no PONV.    OBJECTIVE:     Vital Signs Last 24 Hrs  T(C): 36.4 (04 Sep 2019 03:30), Max: 36.7 (03 Sep 2019 10:52)  T(F): 97.5 (04 Sep 2019 03:30), Max: 98 (03 Sep 2019 10:52)  HR: 69 (04 Sep 2019 05:53) (59 - 70)  BP: 127/81 (04 Sep 2019 05:53) (111/68 - 135/78)  BP(mean): --  RR: 17 (04 Sep 2019 03:30) (16 - 24)  SpO2: 96% (04 Sep 2019 03:30) (96% - 100%)    Affected extremity:          Dressing:  clean/dry/intact            Sensation; intact to light touch           Motor exam: Toes warm and mobile          No calf tenderness bilateral LE's    LABS:                  A/P :   s/p Right knee arthroscopy  -    Pain very well managed, Analgesics PRN  -    DVT ppx: ASA81 twice daily  -    Periop abx:  Ancef to be completed today   -    Check AM labs  -    Weight bearing status: WBAT   -    CPM, will check if surgeon wants CPM for home  -    Resume home meds  -    Physical Therapy  -    Occupational Therapy  -    Dispo: Home

## 2019-09-10 NOTE — CONSULT LETTER
[Dear  ___] : Dear  [unfilled], [Please see my note below.] : Please see my note below. [Sincerely,] : Sincerely, [FreeTextEntry3] : Dr. Hopkins

## 2019-09-10 NOTE — PHYSICAL EXAM
[de-identified] : Well-nourished, in no acute distress\par Alert and oriented to time, place and person\par Skin: no lesions discoloration\par Respirations: unlabored\par Cardiac: no leg swelling\par Lymphatic: no groin adenopathy\par right knee:neutral, healed, flexion 5-95 degrees, terminal crepitus and clunking, ligaments intact  [de-identified] : AP, notch standing, lateral and sunrise Xrays of the right knee taken in the office today demonstrates component alignment maintained. lateral patella facet femoral implant impingement unchanged

## 2019-09-10 NOTE — DISCUSSION/SUMMARY
[de-identified] : The patient presents with patella clunk syndrome.  We discussed the nature of the condition and treatment options. We discussed the possibility of arthroscopy, the risks benefits and alternatives to this procedure. The patient is a candidate for surgery based on imaging and quality of life. We discussed risks, benefits and alternatives to surgery.\par \par \par The patient was seen in my office regarding right knee pain. As a part of that visit, I recommended treatment with a ROM knee orthosis due to restrictions my patient is experiencing with functional limitations of daily activities. \par \par This ROM knee orthosis, in conjunction with other previously prescribed regimens will assist in overall treatment of my patient's condition. \par \par Dr. Hopkins\par \par \par

## 2019-09-10 NOTE — ADDENDUM
[FreeTextEntry1] : I, Cj Nuñez, acted solely as a scribe for Dr. Brian Hopkins on 08/28/2019  .\par  \par All medical record entries made by the scribe were at my, Dr. Brian Hopkins, direction and personally dictated by me on 08/28/2019. I have reviewed the chart and agree that the record accurately reflects my personal performance of the history, physical exam, assessment and plan. I have also personally directed, reviewed, and agreed with the chart.\par

## 2019-09-10 NOTE — HISTORY OF PRESENT ILLNESS
[de-identified] : 78 year old female presents  for a preop skin check for right knee arthroscopic debridement of scar tissue. S/p right TKR October 2nd, 2018. She states when she transitions from sitting to standing, her knee crunches and has pain. Once the patient is ambulating, the pain resides.   The pain is negatively impacting the patient's quality of life, and limiting normal daily activities.\par

## 2019-09-18 ENCOUNTER — APPOINTMENT (OUTPATIENT)
Dept: ORTHOPEDIC SURGERY | Facility: CLINIC | Age: 79
End: 2019-09-18
Payer: MEDICARE

## 2019-09-18 PROBLEM — I49.3 VENTRICULAR PREMATURE DEPOLARIZATION: Chronic | Status: ACTIVE | Noted: 2019-08-20

## 2019-09-18 PROBLEM — T85.848A PAIN DUE TO OTHER INTERNAL PROSTHETIC DEVICES, IMPLANTS AND GRAFTS, INITIAL ENCOUNTER: Chronic | Status: ACTIVE | Noted: 2019-08-16

## 2019-09-18 PROCEDURE — 99024 POSTOP FOLLOW-UP VISIT: CPT

## 2019-10-02 ENCOUNTER — APPOINTMENT (OUTPATIENT)
Dept: ORTHOPEDIC SURGERY | Facility: CLINIC | Age: 79
End: 2019-10-02
Payer: SELF-PAY

## 2019-10-02 PROCEDURE — 99024 POSTOP FOLLOW-UP VISIT: CPT

## 2019-10-02 RX ORDER — MELOXICAM 15 MG/1
15 TABLET ORAL DAILY
Qty: 30 | Refills: 3 | Status: ACTIVE | COMMUNITY
Start: 2019-10-02 | End: 1900-01-01

## 2019-10-02 NOTE — CONSULT LETTER
Refill sent to pharmacy   [Dear  ___] : Dear  [unfilled], [Please see my note below.] : Please see my note below. [Sincerely,] : Sincerely, [FreeTextEntry3] : Dr. Hopkins

## 2019-10-02 NOTE — HISTORY OF PRESENT ILLNESS
[de-identified] : s/p right knee patella clunk arthroscopic debridement 9/3/19. s/p right tkr 10/2/2018 [de-identified] : 78 year old female presents s/p right knee debridement. Improvement in preop pain, but recently pain "12/10" in right knee 5 days ago, in which she could not exit her car. no fever or chills. undergoing PT, with last appointment wednesday last week. \par \par \par  She c/o  some stiffness in the morning.\par Denies fever,chills, groin pain, thigh pain. Ambulates freely.  [de-identified] : Well nourished and no distress\par right knee:  incision healed, Neurovascularly intact, PROM pain free 5/95 , neutral, no effusion, ligaments intact, tender lateral facet patella. tender portals. \par  [de-identified] : No new images were taken in the office today.\par \par  [de-identified] : no PT for now. Mobic 15 re exam 1 week [de-identified] : right knee arthroscopic debridement \par lateral patella facet impirngement

## 2019-10-02 NOTE — ADDENDUM
[FreeTextEntry1] : I, Cj Nuñez, acted solely as a scribe for Dr. Brian Hopkins on 10/02/2019  .\par  \par All medical record entries made by the scribe were at my, Dr. Brian Hopkins, direction and personally dictated by me on 10/02/2019. I have reviewed the chart and agree that the record accurately reflects my personal performance of the history, physical exam, assessment and plan. I have also personally directed, reviewed, and agreed with the chart.\par

## 2019-10-05 NOTE — ADDENDUM
[FreeTextEntry1] : I, Cj Nuñez, acted solely as a scribe for Dr. Brian Hopkins on 09/18/2019  .\par  \par All medical record entries made by the scribe were at my, Dr. Brian Hopkins, direction and personally dictated by me on 09/18/2019. I have reviewed the chart and agree that the record accurately reflects my personal performance of the history, physical exam, assessment and plan. I have also personally directed, reviewed, and agreed with the chart.\par

## 2019-10-05 NOTE — HISTORY OF PRESENT ILLNESS
[de-identified] : s/p right knee patella clunk arthroscopic debridement 9/3/19. s/p right tkr 10/2/2018 [de-identified] : 78 year old female presents s/p right knee debridement. Improvement in preop pain. She c/o  some stiffness in the morning.Denies fever,chills, groin pain, thigh pain. Ambulates freely. 5  [de-identified] : Well nourished and no distress\par right knee:  incision healed, sutures intact, Neurovascularly intact, PROM pain free, l neutral, no effusion, ligaments intact\par  [de-identified] : right knee arthroscopic debridement  [de-identified] : No new images were taken in the office today.\par \par  [de-identified] : sutures removed. FU 1 month

## 2019-10-23 ENCOUNTER — APPOINTMENT (OUTPATIENT)
Dept: ORTHOPEDIC SURGERY | Facility: CLINIC | Age: 79
End: 2019-10-23
Payer: MEDICARE

## 2019-10-23 VITALS — DIASTOLIC BLOOD PRESSURE: 80 MMHG | HEART RATE: 79 BPM | SYSTOLIC BLOOD PRESSURE: 131 MMHG

## 2019-10-23 PROCEDURE — 99024 POSTOP FOLLOW-UP VISIT: CPT

## 2019-10-23 NOTE — HISTORY OF PRESENT ILLNESS
[de-identified] : s/p right knee patella clunk arthroscopic debridement 9/3/19. s/p right tkr 10/2/2018 [de-identified] : 78 year old female presents s/p right knee debridement. Improvement in preop pain. She still has occasional pain right knee, but much improved since last visit. \par Denies fever,chills, groin pain, thigh pain. Ambulates freely.  [de-identified] : Well nourished and no distress\par right knee:  incision healed, Neurovascularly intact, portals healed, no effusion, flexion 0-105 degrees\par  [de-identified] : No new images were taken in the office today.\par \par  [de-identified] : right knee arthroscopic debridement \par  [de-identified] : no PT for now. Mobic 15 mg re exam 2 months

## 2019-10-23 NOTE — ADDENDUM
[FreeTextEntry1] : I, Cj Nuñez, acted solely as a scribe for Dr. Brian Hopkins on 10/23/2019  .\par  \par All medical record entries made by the scribe were at my, Dr. Brian Hopkins, direction and personally dictated by me on 10/23/2019. I have reviewed the chart and agree that the record accurately reflects my personal performance of the history, physical exam, assessment and plan. I have also personally directed, reviewed, and agreed with the chart.\par

## 2019-12-18 ENCOUNTER — APPOINTMENT (OUTPATIENT)
Dept: ORTHOPEDIC SURGERY | Facility: CLINIC | Age: 79
End: 2019-12-18
Payer: MEDICARE

## 2019-12-18 VITALS
SYSTOLIC BLOOD PRESSURE: 123 MMHG | DIASTOLIC BLOOD PRESSURE: 78 MMHG | BODY MASS INDEX: 31.99 KG/M2 | HEIGHT: 65 IN | HEART RATE: 72 BPM | WEIGHT: 192 LBS

## 2019-12-18 PROCEDURE — 99212 OFFICE O/P EST SF 10 MIN: CPT

## 2019-12-18 NOTE — PHYSICAL EXAM
[de-identified] : Well-nourished, in no acute distress\par Alert and oriented to time, place and person\par Skin: no lesions discoloration\par Respirations: unlabored\par Cardiac: no leg swelling\par Lymphatic: no groin adenopathy\par right knee: \par neutral, no effusion, ROM 0-110 degrees  LIGAMENts intact minimal crepitance [de-identified] : No new images were taken in the office today.

## 2019-12-18 NOTE — DISCUSSION/SUMMARY
[de-identified] : The patient is s/p right total knee replacement and arthroscopic debridement. She is doing well, and reports only minimal stiffness. She will continue recommended exercises and FU with 6 months.

## 2019-12-18 NOTE — CONSULT LETTER
[Dear  ___] : Dear  [unfilled], [Please see my note below.] : Please see my note below. [Sincerely,] : Sincerely, [FreeTextEntry2] : DWAYNE MARTINEZ [FreeTextEntry3] : Dr. Hopkins

## 2019-12-18 NOTE — HISTORY OF PRESENT ILLNESS
[de-identified] : 78 year old female presents s/p right knee patella clunk arthroscopic debridement 9/3/19. s/p right total knee replacement  10/2/2018 Improvement in preop pain. She still has occasional pain right knee, but much improved since last visit. Denies fever,chills, groin pain, thigh pain. Ambulates freely. History of diverticulitis episode and recent bowel resection for AVM

## 2020-06-24 ENCOUNTER — APPOINTMENT (OUTPATIENT)
Dept: ORTHOPEDIC SURGERY | Facility: CLINIC | Age: 80
End: 2020-06-24
Payer: MEDICARE

## 2020-06-24 VITALS — HEIGHT: 65 IN | BODY MASS INDEX: 33.32 KG/M2 | WEIGHT: 200 LBS

## 2020-06-24 DIAGNOSIS — Z96.651 PRESENCE OF RIGHT ARTIFICIAL KNEE JOINT: ICD-10-CM

## 2020-06-24 PROCEDURE — 73562 X-RAY EXAM OF KNEE 3: CPT | Mod: RT

## 2020-06-24 PROCEDURE — 99213 OFFICE O/P EST LOW 20 MIN: CPT

## 2020-06-24 NOTE — DISCUSSION/SUMMARY
[de-identified] : Impression right knee patellofemoral syndrome. I discussed with patient the nature of lateral patella impingement but because of diffuse anterior knee symptoms are indicated that revision facetectomy of lateral facet may not eliminate patient's anterior knee symptoms even if this is performed with or without lateral release and even if there is full revision of femoral and tibial components. Patient has indicated strongly she is not keen to undergo revision surgery and for now feels she will "deal with her symptoms. Followup.

## 2020-06-24 NOTE — HISTORY OF PRESENT ILLNESS
[de-identified] : Patient status post right total knee replacement and arthroscopic debridement for patellar clunk continues to complain of virtually constant right anterior knee pain aggravated by kneeling and associated with morning stiffness. Templating with a cane denies swelling locking giving out right hip pain or low back pain. Denies neurovascular symptoms are extremity

## 2020-06-24 NOTE — DISCUSSION/SUMMARY
[de-identified] : Impression right knee patellofemoral syndrome. I discussed with patient the nature of lateral patella impingement but because of diffuse anterior knee symptoms are indicated that revision facetectomy of lateral facet may not eliminate patient's anterior knee symptoms even if this is performed with or without lateral release and even if there is full revision of femoral and tibial components. Patient has indicated strongly she is not keen to undergo revision surgery and for now feels she will "deal with her symptoms. Followup.

## 2020-06-24 NOTE — HISTORY OF PRESENT ILLNESS
[de-identified] : Patient status post right total knee replacement and arthroscopic debridement for patellar clunk continues to complain of virtually constant right anterior knee pain aggravated by kneeling and associated with morning stiffness. Templating with a cane denies swelling locking giving out right hip pain or low back pain. Denies neurovascular symptoms are extremity

## 2020-06-24 NOTE — PHYSICAL EXAM
[de-identified] : Constitutional:Well nourished , well developed and in no acute distress\par Psychiatric: Alert and oriented to time place and person.Appropriate affect\par Respiratory: Unlabored respirations,no audible wheezing\par Cardiovascular: no leg swelling  ankle edema\par Vascular: no calf or thigh tenderness, \par Peripheral pulses;posterior intact\par Skin:Head, neck, arms and lower extremities:no lesions or discoloration\par Lymphatics:No groin adenopathy\par Neurological: intact light touch sensation and grossly intact coordination and motor power.\par Knee;Right: \par Incision he'll neutral no effusion flexion 0/100 crepitus ligaments intact mild tenderness lateral facet patella and patellar tendon origin\par  [de-identified] : X-ray right knee 3 views total knee components satisfactory alignment. Lateral patella osteophyte with lateral femoral component impingement

## 2020-06-24 NOTE — PHYSICAL EXAM
[de-identified] : Constitutional:Well nourished , well developed and in no acute distress\par Psychiatric: Alert and oriented to time place and person.Appropriate affect\par Respiratory: Unlabored respirations,no audible wheezing\par Cardiovascular: no leg swelling  ankle edema\par Vascular: no calf or thigh tenderness, \par Peripheral pulses;posterior intact\par Skin:Head, neck, arms and lower extremities:no lesions or discoloration\par Lymphatics:No groin adenopathy\par Neurological: intact light touch sensation and grossly intact coordination and motor power.\par Knee;Right: \par Incision he'll neutral no effusion flexion 0/100 crepitus ligaments intact mild tenderness lateral facet patella and patellar tendon origin\par  [de-identified] : X-ray right knee 3 views total knee components satisfactory alignment. Lateral patella osteophyte with lateral femoral component impingement

## 2020-09-23 ENCOUNTER — APPOINTMENT (OUTPATIENT)
Dept: ORTHOPEDIC SURGERY | Facility: CLINIC | Age: 80
End: 2020-09-23
Payer: MEDICARE

## 2020-09-23 VITALS — BODY MASS INDEX: 33.32 KG/M2 | WEIGHT: 200 LBS | HEIGHT: 65 IN

## 2020-09-23 DIAGNOSIS — M47.817 SPONDYLOSIS W/OUT MYELOPATHY OR RADICULOPATHY, LUMBOSACRAL REGION: ICD-10-CM

## 2020-09-23 PROCEDURE — 99213 OFFICE O/P EST LOW 20 MIN: CPT

## 2020-09-23 NOTE — PHYSICAL EXAM
[de-identified] : Constitutional:Well nourished , well developed and in no acute distress\par Psychiatric: Alert and oriented to time place and person.Appropriate affect\par Respiratory: Unlabored respirations,no audible wheezing\par Cardiovascular: no leg swelling  ankle edema\par Vascular: no calf or thigh tenderness, \par Peripheral pulses;posterior intact\par Skin:Head, neck, arms and lower extremities:no lesions or discoloration\par Lymphatics:No groin adenopathy\par Neurological: intact light touch sensation and grossly intact coordination and motor power.\par Knee;Right: \par Incision he'll neutral no effusion flexion 0/100  ligaments intact mild tenderness lateral facet patella and patellar tendon origin\par  [de-identified] : X-ray right knee 3 views total knee components satisfactory alignment. Lateral patella osteophyte with lateral femoral component impingement

## 2020-09-23 NOTE — HISTORY OF PRESENT ILLNESS
[de-identified] : Patient status post right total knee replacement and arthroscopic debridement for patellar clunk continues to complain of Chronic intermittent diffuse anterior greater than popliteal knee pain particularly at night in bed. During the day when ambulating she is relatively symptom free. Denies swelling locking giving out does report chronic intermittent low back pain experienced with walking

## 2020-09-23 NOTE — CONSULT LETTER
[Dear  ___] : Dear  [unfilled], [Courtesy Letter:] : I had the pleasure of seeing your patient, [unfilled], in my office today. [Consult Closing:] : Thank you very much for allowing me to participate in the care of this patient.  If you have any questions, please do not hesitate to contact me. [Sincerely,] : Sincerely, [FreeTextEntry2] : Zainab Beckett [FreeTextEntry3] : Brian Hopkins MD\par

## 2021-08-24 NOTE — H&P PST ADULT - PMH
On 11/24/2020, pt did Establish Care at [facility] - Laurence Licona NP.    
BMI 33.0-33.9,adult    Diverticulitis    Essential hypertension    Hyperlipidemia    Obesity (BMI 30.0-34.9)    Osteoarthritis of right knee

## 2022-01-01 NOTE — PATIENT PROFILE ADULT - CAREGIVER RELATION TO PATIENT
Emergency Department SIGN OUT NOTE     Patient: Beatrice Ann Age: 2 week old Sex: male   MRN: [id number] : 2022 Encounter Date: 2022     Received Sign-Out From: Jesus Moon DO  Dispo: Admitted to floor  History & Course: 1week old male    fever, s/p amp gent, CSF, BCx  Pending items: NTD      Admit 2022  5:38 AM  Telemetry Bed?: No  Admitting Physician: Mariela Bowers [[id number]]  Is this a telephone or verbal order?: This is a telephone order from the admitting physician  Transferring Patient to? Only adjust for transfers between [facility] and [address] [place] [address] and [address] [place] [address]): [address] [place] [address] [[id number]]    The patient's evaluation and treatment has been initiated and/or completed by prior team. I have only participated in the care of this patient as noted. Please refer to their documentation for further details regarding their care. [] This patient's care has been transitioned to the primary admitting or observation team. The patient is currently boarding in the ED, awaiting bed placement in the hospital. Unless otherwise noted, I have not evaluated the patient personally or had any active involvement in the patient's care. ED Course as of 22 1420      2324 Urinalysis overall unremarkable. Quad screen negative. [RK]   7539 CSF studies pending. Patient admitted to floor for further management.  [RK]      ED Course User Index  [RK] Beatris Morales MD     Vital Last Value 24 Hour Range   Temperature 98.6 Â°F (37 Â°C) (22 0215) Temp  Min: 98.6 Â°F (37 Â°C)  Max: 98.6 Â°F (37 Â°C)   Pulse 122 (22 0500) Pulse  Min: 122  Max: 150   Respiratory 44 (22 0500) Resp  Min: 44  Max: 44   Non-Invasive  Blood Pressure  (alexa x3) (22 0500) BP  Min: 69/45  Max: 69/45   Pulse Oximetry 100 % (22 0500) SpO2  Min: 99 %  Max: 100 %   Arterial   Blood Pressure   No data recorded     Results for orders placed or performed during the hospital encounter of 07/08/22   Urinalysis With Microscopy Exam W/O C/S   Result Value Ref Range    COLOR, URINALYSIS Straw     APPEARANCE, URINALYSIS Clear     GLUCOSE, URINALYSIS Negative Negative mg/dL    BILIRUBIN, URINALYSIS Negative Negative    KETONES, URINALYSIS Negative Negative mg/dL    SPECIFIC GRAVITY, URINALYSIS <1.005 (L) 1.005 - 1.030    OCCULT BLOOD, URINALYSIS Negative Negative    PH, URINALYSIS 7.0 5.0 - 7.0    PROTEIN, URINALYSIS Negative Negative mg/dL    UROBILINOGEN, URINALYSIS 0.2 0.2, 1.0 mg/dL    NITRITE, URINALYSIS Negative Negative    LEUKOCYTE ESTERASE, URINALYSIS Negative Negative    SQUAMOUS EPITHELIAL, URINALYSIS 1 to 5 None Seen, 1 to 5 /hpf    ERYTHROCYTES, URINALYSIS 1 to 2 None Seen, 1 to 2 /hpf    LEUKOCYTES, URINALYSIS 6 to 10 (A) None Seen, 1 to 5 /hpf    BACTERIA, URINALYSIS None Seen None Seen /hpf    HYALINE CASTS, URINALYSIS 6 to 10 (A) None Seen, 1 to 5 /lpf   COVID/Flu/RSV panel   Result Value Ref Range    Rapid SARS-COV-2 by PCR Not Detected Not Detected / Detected / Presumptive Positive / Inhibitors present    Influenza A by PCR Not Detected Not Detected    Influenza B by PCR Not Detected Not Detected    RSV BY PCR Not Detected Not Detected    Isolation Guidelines      Procedural Comment       No orders to display     No results found for this or any previous visit (from the past 4464 hour(s)).   Medications received in the department:   ED Medication Orders (From admission, onward)    Ordered Start     Status Ordering Provider    07/08/22 0449 07/08/22 0530  ampicillin 320 mg in NS pediatric IV syringe  EVERY 6 HOURS        Note to Pharmacy: Per md, rule out sepsis/cns    Last [facility] action: [place]    07/08/22 0449 07/08/22 0530  gentamicin 20 mg in sodium chloride 0.9% maureen/ped IVPB syringe  EVERY 24 HOURS         AMBER Coles    07/08/22 0526 07/08/22 0527  sodium chloride (NORMAL SALINE) 0.9 % bolus 83.1 mL  ONCE Last [facility] action: New Bag Juma Kimberly    07/08/22 0439 07/08/22 0439  LIDOCAINE 4 % EX CREA (WRAPPED) Pyxis Override        Note to Pharmacy: Harriet Henriquez: cabinet override    [address]  Resident  07/08/22 1421 son

## 2022-03-25 NOTE — OCCUPATIONAL THERAPY INITIAL EVALUATION ADULT - TRANSFER TRAINING, PT EVAL
OUTPATIENT CATHETERIZATION INSTRUCTIONS    You have been scheduled for a procedure in the catheterization lab on Thursday, April 21, 2022.     Please report to the Cardiology Waiting Area on the Third floor of the hospital and check in at 7 AM.   You will then be taken to the SSCU (Short Stay Cardiac Unit) and prepared for your procedure. Please be aware that this is not the time of your procedure but the time you are to arrive. The procedures are scheduled on an hourly basis; however, emergency cases take precedence over all other cases.       1. No solid foods 8 hours prior to your procedure.  You may have clear liquids (12 ounces or 1 1/2 cups) up until 2 hours of your procedure.  Patients with gastric emptying issues should be fasting for 6- 8 hours prior to the procedure.  Clear liquids include water, black coffee, clear juices, and performance drinks - no pulp or milk.    2. Heart failure or dialysis patients will be limited to 8 ounces (1 cup) of clear liquids up until 2 hours of the procedure.    3.   You may take your regular morning medications with water. If there are any medications that you should not take you will be instructed to hold them that morning. If you         are diabetic and on Metformin (Glucophage) do not take it the day before, the day of, and for 2 days after your procedure.  4.   If you are on Pradaxa, Eliquis or Coumadin , you can hold them 3 days prior to your procedure.      The procedure will take 1-2 hours to perform. After the procedure, you will return to SSCU on the third floor of the hospital. You will need to lie still (or keep your arm still) for the next 2 to 4 hours to minimize bleeding from the puncture site.  This time is determined by your physician.  Your family may remain in the room with you during this time.       You may be able to be discharged home that same afternoon if there is someone to drive you home and there are no complications.  Your doctor will  "determine, based on your progress, the date and time of your discharge. The results of your procedure will be discussed with you before you are discharged. Any further testing or procedures will be scheduled for you either before you leave or after your discharge..       If you should have any questions, concerns, or need to change the date of your procedure, please call "CHACHO Steele @ [phone]    Special Instructions:    Get your labs completed by Tuesday April 19, 2022. Please have them fax the results to Ivette FLOR at [phone]    Drink plenty of water the day before and after your procedure.     Take your Aspirin and Plavix everyday, and the morning of your procedure.    Continue taking you other medications.          THE ABOVE INSTRUCTIONS WERE GIVEN TO THE PATIENT VERBALLY AND THEY VERBALIZED UNDERSTANDING.  THEY DO NOT REQUIRE ANY SPECIAL NEEDS AND DO NOT HAVE ANY LEARNING BARRIERS.          Directions for Reporting to Cardiology Waiting Area in the Hospital  If you park in the Parking Garage:  Take elevators to the1st floor of the parking garage.  Continue past the gift shop, coffee shop, and piano.  Take a right and go to the gold elevators. (Elevator B)  Take the elevator to the 3rd floor.  Follow the arrow on the sign on the wall that says Cath Lab Registration/EP Lab Registration.  Follow the long hallway all the way around until you come to a big open area.  This is the registration area.  Check in at Reception Desk.    OR    If family is dropping you off:  Have them drop you off at the front of the Hospital under the green overhang.  Enter through the doors and take a right.  Take the E elevators to the 3rd floor Cardiology Waiting Area.  Check in at the Reception Desk in the waiting room.                " Patient will transfer to toilet and stall shower with DME as needed with supervision in 2-4 sessions.

## 2022-08-18 NOTE — ADDENDUM
[FreeTextEntry1] : I, Cj Nuñez, acted solely as a scribe for Dr. Brian Hopkins on 12/18/2019  .\par  \par All medical record entries made by the scribe were at my, Dr. Brian Hopkins, direction and personally dictated by me on 12/18/2019. I have reviewed the chart and agree that the record accurately reflects my personal performance of the history, physical exam, assessment and plan. I have also personally directed, reviewed, and agreed with the chart.  Benzoyl Peroxide Counseling: Patient counseled that medicine may cause skin irritation and bleach clothing.  In the event of skin irritation, the patient was advised to reduce the amount of the drug applied or use it less frequently.   The patient verbalized understanding of the proper use and possible adverse effects of benzoyl peroxide.  All of the patient's questions and concerns were addressed.

## 2023-04-05 ENCOUNTER — APPOINTMENT (OUTPATIENT)
Dept: ORTHOPEDIC SURGERY | Facility: CLINIC | Age: 83
End: 2023-04-05
Payer: MEDICARE

## 2023-04-05 VITALS — HEIGHT: 66 IN | WEIGHT: 209 LBS | BODY MASS INDEX: 33.59 KG/M2

## 2023-04-05 DIAGNOSIS — Z96.642 PRESENCE OF LEFT ARTIFICIAL HIP JOINT: ICD-10-CM

## 2023-04-05 DIAGNOSIS — Z96.641 PRESENCE OF RIGHT ARTIFICIAL HIP JOINT: ICD-10-CM

## 2023-04-05 DIAGNOSIS — M17.11 UNILATERAL PRIMARY OSTEOARTHRITIS, RIGHT KNEE: ICD-10-CM

## 2023-04-05 PROCEDURE — 73562 X-RAY EXAM OF KNEE 3: CPT | Mod: RT

## 2023-04-05 PROCEDURE — 99214 OFFICE O/P EST MOD 30 MIN: CPT

## 2023-04-05 PROCEDURE — 73521 X-RAY EXAM HIPS BI 2 VIEWS: CPT

## 2023-04-11 PROBLEM — Z96.641 HISTORY OF RIGHT HIP REPLACEMENT: Status: ACTIVE | Noted: 2019-01-09

## 2023-04-11 PROBLEM — Z96.642 HISTORY OF LEFT HIP REPLACEMENT: Status: ACTIVE | Noted: 2023-04-11

## 2023-04-11 NOTE — ADDENDUM
[FreeTextEntry1] : I, Harpal Danielle, acted solely as a scribe for Dr. Rosales Hopkins MD on this date 04/05/2023  .\par  \par All medical record entries made by the Scribe were at my,  Dr. Rosales Hopkins MD , direction and personally dictated by me on 04/05/2023 . I have reviewed the chart and agree that the record accurately reflects my personal performance of the history, physical exam, assessment and plan. I have also personally directed, reviewed, and agreed with the chart.

## 2023-04-11 NOTE — PHYSICAL EXAM
[de-identified] : Constitutional: Well nourished , well developed and in no acute distress\par Psychiatric: Alert and oriented to time place and person.Appropriate affect .\par Skin: Head, neck, arms and lower extremities:no lesions or discoloration\par HEENT: Normocephalic, EOM intact, Nasal septum midline,\par Respiratory: Unlabored respirations,no audible wheezing ,no tachypnea, no cyanosis\par Cardiovascular: No leg swelling no ankle edema no JVD, pulse regular\par Vascular: No calf or thigh tenderness, \par Peripheral pulses: intact\par Lymphatics: No groin adenopathy,no lymphedema lower or upper extremities\par \par Right Hip\par Satisfactory gait\par Passive range of motion: flexion 90 degrees, internal 20 degrees, external 40 degrees, abduction 30 degrees, adduction 30  degrees\par \par Left Hip\par Satisfactory gait\par Passive range of motion: flexion 90 degrees, internal 20 degrees, external 50 degrees, abduction 45 degrees, adduction 30 degrees \par \par right knee healed incision satisfactory rom  ligaments intact [de-identified] : X-rays of the AP pelvis and bilateral hip AP and Lateral obtained today 04/05/2023 shows total hip replacement components in good alignment, no osteolysis, femoral heads well centered in acetabulum.  negative

## 2023-04-11 NOTE — HISTORY OF PRESENT ILLNESS
[de-identified] : 82 year old female presents for follow up evaluation bilateral hips s/p left total hip replacement 2015, right total hip replacement 2016. [employer] recall. Doing well. Ambulating independently. Denies groin pain or thigh pain. Patient notes intermittent pain with increased exercise.prior right total knee replacement\par \par

## 2023-04-11 NOTE — DISCUSSION/SUMMARY
[de-identified] : 82 year old female s/p left total hip replacement 2015, right total hip replacement 2016, [employer] recall. Follow up in 1 year.right total knee

## 2023-05-31 NOTE — PATIENT PROFILE ADULT - FUNCTIONAL SCREEN CURRENT LEVEL: COMMUNICATION, MLM
cleats on your shoes to prevent you from slipping on the snow and ice. Exercise regularly to help maintain muscle tone, agility, and balance. Always hold the banister when going up or down stairs. Also, use  bars when getting in or out of the bath or shower, or using the toilet. To avoid dizziness, get up slowly from a lying down position. Sit up first, dangling your legs for a minute or two before rising to a standing position. Overall Home Safety Check   According to the Consumer Product Safety Commision's \"Older Consumer Home Safety Checklist,\" it is important to check for potential hazards in each room. And remember, proper lighting is an essential factor in home safety. If you cannot see clearly, you are more likely to fall. Important questions to ask yourself include:   Are lamp, electric, extension, and telephone cords placed out of the flow of traffic and maintained in good condition? Have frayed cords been replaced? Are all small rugs and runners slip resistant? If not, you can secure them to the floor with a special double-sided carpet tape. Are smoke detectors properly locatedone on every floor of your home and one outside of every sleeping area? Are they in good working order? Are batteries replaced at least once a year? Do you have a well-maintained carbon monoxide detector outside every sleeping are in your home? Does your furniture layout leave plenty of space to maneuver between and around chairs, tables, beds, and sofas? Are hallways, stairs and passages between rooms well lit? Can you reach a lamp without getting out of bed? Are floor surfaces well maintained? Shag rugs, high-pile carpeting, tile floors, and polished wood floors can be particularly slippery. Stairs should always have handrails and be carpeted or fitted with a non-skid tread. Is your telephone easily reachable. Is the cord safely tucked away?    Room by Room   According to the Association of Aging,
0 = understands/communicates without difficulty

## 2023-08-21 NOTE — DISCUSSION/SUMMARY
[de-identified] : Impression right total knee replacement\par Lumbar degenerative disc disease Possible referred pain right knee\par Plan;physiatry evaluation lumbar spine, followup 2 months
No

## 2024-04-10 ENCOUNTER — APPOINTMENT (OUTPATIENT)
Dept: ORTHOPEDIC SURGERY | Facility: CLINIC | Age: 84
End: 2024-04-10
Payer: MEDICARE

## 2024-04-10 VITALS — HEIGHT: 66 IN | WEIGHT: 202 LBS | BODY MASS INDEX: 32.47 KG/M2

## 2024-04-10 DIAGNOSIS — Z96.651 PRESENCE OF RIGHT ARTIFICIAL KNEE JOINT: ICD-10-CM

## 2024-04-10 PROCEDURE — 73562 X-RAY EXAM OF KNEE 3: CPT | Mod: RT

## 2024-04-10 PROCEDURE — 99213 OFFICE O/P EST LOW 20 MIN: CPT

## 2024-04-10 NOTE — HISTORY OF PRESENT ILLNESS
[de-identified] : EFRA WHITTEN is a 83 year old female who presents for follow up evaluation of bilateral hips s/p left total hip replacement 2015, right total hip replacement 2016, and right total knee replacement. [employer] recall. Doing well. Ambulating independently. Denies groin pain or thigh pain. She is able to negotiate stairs well. Patient notes occasional intermittent pain with increased exercise of prior right total knee replacement. Denies any swelling, locking, or giving out of the knee.

## 2024-04-10 NOTE — PHYSICAL EXAM
[de-identified] : Constitutional: Well nourished , well developed and in no acute distress Psychiatric: Alert and oriented to time place and person.Appropriate affect . Skin: Head, neck, arms and lower extremities:no lesions or discoloration HEENT: Normocephalic, EOM intact, Nasal septum midline, Respiratory: Unlabored respirations,no audible wheezing ,no tachypnea, no cyanosis Cardiovascular: No leg swelling no ankle edema no JVD, pulse regular Vascular: No calf or thigh tenderness, Peripheral pulses: intact Lymphatics: No groin adenopathy,no lymphedema lower or upper extremities   o Right Hip Exam: Inspection/Palpation : no tenderness, no swelling, no deformity Leg Lengths: equal Passive Range of Motion: Saitsfactory pain free. Strength: resisted hip flexion 5/5, resisted hip abduction 5/5 Skin : no erythema, no ecchymosis Sensation : sensation to light touch intact Vascular Exam : no edema, no cyanosis, dorsalis pedis artery pulse 2+, posterior tibial artery pulse 2+   o Left Hip Exam: Inspection/Palpation : no tenderness, no swelling, no deformity Leg Lengths: equal Passive Range of Motion:  Saitsfactory pain free. Strength: resisted hip flexion 5/5, resisted hip abduction 5/5 Skin : no erythema, no ecchymosis Sensation : sensation to light touch intact Vascular Exam : no edema, no cyanosis, dorsalis pedis artery pulse 2+, posterior tibial artery pulse 2+  o Right Knee Exam: Inspection/Palpation : Tenderness to palpation over the medial parapatella, no swelling, no deformity, incision wel healed. Range of Motion : 5 - 115 degrees, no crepitus Stability : no valgus or varus instability present on provocative testing, Lachmans Test (-) Sensation : sensation to pin intact Vascular Exam : no edema, no cyanosis, dorsalis pedis artery pulse 2+, posterior tibial artery pulse 2+ Skin : no erythema, no ecchymosis [de-identified] : AP/Lateral/skyline views of the RIGHT knee obtained today 04/10/2024 demonstrates total knee replacement components in good alignment, no evidence of loosening, well centered patella.  X-rays of the AP pelvis and bilateral hip AP and Lateral obtained 04/05/2023 shows total hip replacement components in good alignment, no osteolysis, femoral heads well centered in acetabulum.

## 2024-04-10 NOTE — ADDENDUM
[FreeTextEntry1] : I, Carine Doll, acted solely as a scribe for Dr. Rosales Hopkins MD on this date  04/10/2024  All medical record entries made by the Scribe were at my, Dr. Rosales Hopkins MD , direction and personally dictated by me on 04/10/2024. I have reviewed the chart and agree that the record accurately reflects my personal performance of the history, physical exam, assessment and plan. I have also personally directed, reviewed, and agreed with the chart.

## 2024-04-10 NOTE — DISCUSSION/SUMMARY
[de-identified] : 82 year old female s/p left total hip replacement 2015, right total hip replacement 2016, [employer] recall. Follow up in 1 year.right total knee

## 2025-01-17 ENCOUNTER — NON-APPOINTMENT (OUTPATIENT)
Age: 85
End: 2025-01-17

## 2025-03-04 NOTE — H&P PST ADULT - PSH
No
History of hip replacement, total, right  2016  S/P breast biopsy, left  2017  S/P hip replacement, left  2015

## 2025-07-30 NOTE — BRIEF OPERATIVE NOTE - TYPE OF ANESTHESIA
Attempted to notify patient of Common Hereditary Cancer panel paired with RNA analysis through [facility]. This is NEGATIVE for any pathogenic variants or variants of uncertain significance.  Results will be scanned into EPIC.  Left message for patient to call office to review results.        These results can mean one of a few things: 1). Rere Garcia    has an undetectable gene mutation in one of the above mentioned genes, 2). there is another, unidentified gene associated with the cancers in the family, 3). the cancer in the family is due to a combination of unknown environmental and genetic factors.     [facility] Risk Scores as of 7/30/2025       [facility]         Patient Population    Breast cancer  (CMD) 5-year 0.27% 0.13%    Breast cancer  (CMD) 10-year 0.9% 0.42%    Breast cancer  (CMD) lifetime 23.19% 11.18%    Breast cancer genetic susceptibility 0%     Breast cancer, BRCA1 positive  (CMD) 0%     Breast cancer, BRCA2 positive  (CMD) 0%                       Based on the numbers generated by [facility] risk assessment models, I recommended that the patient talk to her physicians about generating a breast cancer screening plan. The American Cancer Society recommends that women with a lifetime risk of breast cancer between 20-25% or greater based on a risk assessment tool, such as the ones mentioned above, receive high risk breast cancer screening.      Given the rapid pace of development of genetic testing, re-referral may be indicated if there are any changes in personal or family histories of cancer as further testing may be indicated.    
Regional